# Patient Record
(demographics unavailable — no encounter records)

---

## 2024-10-25 NOTE — BEGINNING OF VISIT
[0] : 2) Feeling down, depressed, or hopeless: Not at all (0) [PHQ-2 Negative] : PHQ-2 Negative [Pain Scale: ___] : On a scale of 1-10, today the patient's pain is a(n) [unfilled]. [Former] : Former [15-19] : 15-19 [> 15 Years] : > 15 Years [Date Discussed (MM/DD/YY): ___] : Discussed: [unfilled] [Reviewed updated] : Reviewed updated [[id number]] : 0

## 2024-10-25 NOTE — CONSULT LETTER
[Dear  ___] : Dear  [unfilled], [Courtesy Letter:] : I had the pleasure of seeing your patient, [unfilled], in my office today. [Please see my note below.] : Please see my note below. [Sincerely,] : Sincerely, [FreeTextEntry3] : Ishan Smith MD, MPH\par  Attending Physician\par  Hematology Oncology\par  [facility]\par  [facility]\par

## 2024-10-25 NOTE — HISTORY OF PRESENT ILLNESS
[de-identified] : Ms Marshall is a 61 year old female recently seen as inpatient consultation for PE, here for follow up  SHe was admitted to [facility] (19-19) palpitations found to have saddle PE CT angiogram: Saddle embolus with extensive pulmonary emboli extending throughout the right and left pulmonary arterial vasculature  She was noted to also have hypertensive urgency with elevated BP of 214/135. On labs,  an elevated D-dimer to 9.44 was noted which was suspicious for pulmonary embolism. On CT angiography of the chest she was noted to have a saddle embolus with extensive pulmonary emboli extending throughout the right and left pulmonary arterial vasculature. Patient was evaluated by Cardiology and an echocardiogram was done. She was admitted to the ICU for close monitoring and management. She was started on anticoagulation treatment for the pulmonary embolism with Lovenox 80 mg. Once she clinically improved with resolution of the palpitations and shortness of breath, and she remained hemodynamically stable with good oxygen saturations on room air, she was switched from Lovenox to Eliquis 10mg orally twice daily for the treatment of the pulmonary embolism. For the hypertensive urgency with elevated BPs, Cardiology adjusted patient's BP medications - added Amlodipine 5mg daily and Carvedilol 12.5mg orally twice daily. Patient's home Losartan was held during her stay because of the acute kidney injury. Patient's BPs became more controlled and remained stable after the medication adjustments. Patient was kept on IV fluids for the acute kidney injury which resolved. Patient noted chronic history of double vision resulting from right eye droopiness, but reported that she had recently had full neurological and ophthalmological workup done including CT scans and MRIs, which were all negative. This was confirmed by her PMD as well. Patient mentioned that she was prescribed special glasses by Neuro-opthalmology to wear to correct the double vision. She was instructed to continue with the glasses and to follow up with the Neuro-opthalmologist as outpatient. Because she did not have any neurological deficits, it was not deemed necessary to do further workup while inpatient. Patient will need to follow up with her PMD to evaluate whether she will need further workup.  She remained clinically and hemodynamically stable, and was deemed ready for discharge on .   She was discharged on eliquis  SHe is retired but continues to live a healthy life- stays active with her household chores and does yoga Denies any long distance travel, immobilization prior to the event No recent surgeries or hospitalization  No personal or family ho VTEs or miscarriages  She is ex smoker - quit 28 years back. SMoked 1/2 ppd  x 15 years  Hgb 9.8 WBC 3.5  SIFx: One IgG Kappa Band and One Weak Free Kappa Light Chain  Identified  SPEP: M-spike 1: 1.3 g/dl; Mspike 2: unable to quantitate FLCR: 85.80  FActor V Leiden mutation, Prothrombin gene mutation, APS panel: negative Serum viscosity: 1.7  Free protein S Antigen: 44 AT3 A AT3 activity: 124  D Dimer: 9.44 -> 2.17  Bone marrow core biopsy, clot sections and aspirate smears.(19) Plasma cell neoplasm, IgG Kappa, 5-8% of the bone marrow elements, see comment. Adequately maturing myeloid elements; slight erythroid hyperplasia. No evidence of overt dysplasia. No evidence of amyloid deposition. Normal karyotype; no aberrations detected by FISH analysis.  Bone marrow- 10% plasma cells Negative FISH for myeloma  2019 PET/CT without any bone metastases  SHe was recently admitted to [facility] (20-20) for fever, generalized weakness, malaise, bodyaches and nausea. Nasal swab was negative for flu and RSV. Pancultures were negative. SHe was empirically treated with levaquin pending cultures and was subsequently was discharged home  Reports that she fell on her way to the office. No head injury. Scraped her left knee and fingers with minimal bruise, She was bleeding for left fingers s/p dressing  She reports 3 month ho intermittent tingling and numbness in fingers and toes. For the past 2 months she has been dropping things and also has wobbling gait. She somehow did not mention during previous clinic visits She reports being very active prior- was performing yoga regularly SHe now walks with a cane SHe was recently admitted to [facility] (20-20) for fever, generalized weakness, malaise, bodyaches and nausea. Nasal swab was negative for flu and RSV. Pancultures were negative. SHe was empirically treated with levaquin pending cultures and was subsequently was discharged home A week later she fell on her way to the office. No head injury. Scraped her left knee and fingers with minimal bruise, She was bleeding for left fingers s/p dressing  She reports 3 month ho intermittent tingling and numbness in fingers and toes -> Today she reports no tingling and numbness x 1 week  For the past 2 months she has been dropping things and also has wobbling gait- she still drop things but not as often. Gait is back to normal  No longer needs a cane   She saw Dr. Cyril Ricks at [facility] on 12/3/20, was given pamphlet to read and to follow up for stem cell transplant.  She is agreeable to have stem cell collection. May not opt for transplant upfront She will follow up with Dr Ricks  Bone marrow biopsy, bone marrow clot, and bone marrow aspirate: -Markedly hypocellular bone marrow with erythroid predominance and focal/minimal trilineage hematopoiesis with maturation. -Iron stores are present. MRD was not sent  21 [facility] flow cytometry 0.02% phenotypically abnormal cells detected.  Monoclonal plasma cell phenotype, kappa positive.   Bone marrow biopsy, clot and bone marrow aspirate -normocellular bone marrow (overall 30% cellularity) with erythroid - predominantl trilineage hematopoiesis and maturation, iron stores presents. 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            AFB stain:  No mycobacteria identified.            GMS stain:  No fungi identified. C.  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         AFB stain:  No mycobacteria identified.         GMS stain:  No fungi identified.  PET/CT (2021): No lytic bone lesion Multiple new symmetric hypermetabolic slightly enlarged mediastinal and bilateral hilar lymph nodes, for example: -Para-aortic, 2.2 x 0.9 cm, SUV 11.4 107 -Subcarinal, 2.9 x 1.1 cm, SUV 16.3,  -Anterior upper mediastinum, 1.4 x 0.5 cm, SUV 15.0, SUV 98 -Right hilum, up to SUV 14.0 -Left hilum, up to SUV 13.9.  2024 Pet/CT 1. No osseous lesions suspicious for FDG avid malignancy.  2. 3.1 x 2.3 cm FDG avid exophytic mass at the distal pancreatic body/proximal tail, SUV max 3.3, unchanged in size since . A neoplastic lesion cannot be excluded. Further evaluation with MRI is recommended.  3. Mild, nonspecific uptake in a normal-sized subcarinal node. Otherwise, since , there has been interval resolution of hypermetabolic mediastinal and bilateral hilar lymphadenopathy.  4. Left total hip arthroplasty with muscle swelling about the left hip and possible fluid density. There is associated increased, inflammatory uptake, possibly postsurgical in nature. Correlate clinically.  5. Mildly FDG avid presacral edema and lower rectal wall thickening, probably representing proctitis or hemorrhoids.  Correlate clinically  [de-identified] : She is seen today for follow up  s/p RVD (9/25/19 - 1/29/21) On Revlimid maintenance ( 2/5/21 - present) - off since Sept due to stem cell collection - resumed in Jan 2022 Lower dose Revlimid 10 mg daily x 21 days on 5/5/022 - 5/31/22 -  6/3/22, restarted August 2022.  Revlimid held since 12/2022 due to Covid and worsening anemia.   Overall feels well. c/o itchy rash over the back, left elbow She has not yet had MRI abdomen to assess pancreatic mass Reports that it is scheduled for Nov 4th.  Upon enquiry, her PCP is not Dr Tammy Oseguera. [facility] now does not [facility] Strongly advised to see PCP ASAP and be uptodate with age appropriate medical care and cancer screening  Weight - 151 -> 143 lbs -> 140 lbs -> 135 lbs  -139 lbs -> 140 lbs -> 138 lbs -> 132 lbs -> 141 lbs  -> 147 lbs -> 150 lbs -> 156 lbs -> -> 153--> 158--> 160 lbs -> 159 lbs -> 162 lbs  -> 152 lbs (Covid)  -> 161 -> hip surgery--> 145 lbs

## 2024-10-25 NOTE — PHYSICAL EXAM
[Ambulatory and capable of all self care but unable to carry out any work activities] : Status 2- Ambulatory and capable of all self care but unable to carry out any work activities. Up and about more than 50% of waking hours [Normal] : affect appropriate [de-identified] : Atopic dermatitis nape of neck, left elbow

## 2024-10-25 NOTE — BEGINNING OF VISIT
[0] : 2) Feeling down, depressed, or hopeless: Not at all (0) [PHQ-2 Negative] : PHQ-2 Negative [Pain Scale: ___] : On a scale of 1-10, today the patient's pain is a(n) [unfilled]. [Former] : Former [15-19] : 15-19 [> 15 Years] : > 15 Years [Date Discussed (MM/DD/YY): ___] : Discussed: [unfilled] [Reviewed updated] : Reviewed updated [[id number]] : 0

## 2024-10-25 NOTE — PHYSICAL EXAM
[Ambulatory and capable of all self care but unable to carry out any work activities] : Status 2- Ambulatory and capable of all self care but unable to carry out any work activities. Up and about more than 50% of waking hours [Normal] : affect appropriate [de-identified] : Atopic dermatitis nape of neck, left elbow

## 2024-10-25 NOTE — HISTORY OF PRESENT ILLNESS
[de-identified] : Ms Marshall is a 61 year old female recently seen as inpatient consultation for PE, here for follow up  SHe was admitted to [facility] (19-19) palpitations found to have saddle PE CT angiogram: Saddle embolus with extensive pulmonary emboli extending throughout the right and left pulmonary arterial vasculature  She was noted to also have hypertensive urgency with elevated BP of 214/135. On labs,  an elevated D-dimer to 9.44 was noted which was suspicious for pulmonary embolism. On CT angiography of the chest she was noted to have a saddle embolus with extensive pulmonary emboli extending throughout the right and left pulmonary arterial vasculature. Patient was evaluated by Cardiology and an echocardiogram was done. She was admitted to the ICU for close monitoring and management. She was started on anticoagulation treatment for the pulmonary embolism with Lovenox 80 mg. Once she clinically improved with resolution of the palpitations and shortness of breath, and she remained hemodynamically stable with good oxygen saturations on room air, she was switched from Lovenox to Eliquis 10mg orally twice daily for the treatment of the pulmonary embolism. For the hypertensive urgency with elevated BPs, Cardiology adjusted patient's BP medications - added Amlodipine 5mg daily and Carvedilol 12.5mg orally twice daily. Patient's home Losartan was held during her stay because of the acute kidney injury. Patient's BPs became more controlled and remained stable after the medication adjustments. Patient was kept on IV fluids for the acute kidney injury which resolved. Patient noted chronic history of double vision resulting from right eye droopiness, but reported that she had recently had full neurological and ophthalmological workup done including CT scans and MRIs, which were all negative. This was confirmed by her PMD as well. Patient mentioned that she was prescribed special glasses by Neuro-opthalmology to wear to correct the double vision. She was instructed to continue with the glasses and to follow up with the Neuro-opthalmologist as outpatient. Because she did not have any neurological deficits, it was not deemed necessary to do further workup while inpatient. Patient will need to follow up with her PMD to evaluate whether she will need further workup.  She remained clinically and hemodynamically stable, and was deemed ready for discharge on .   She was discharged on eliquis  SHe is retired but continues to live a healthy life- stays active with her household chores and does yoga Denies any long distance travel, immobilization prior to the event No recent surgeries or hospitalization  No personal or family ho VTEs or miscarriages  She is ex smoker - quit 28 years back. SMoked 1/2 ppd  x 15 years  Hgb 9.8 WBC 3.5  SIFx: One IgG Kappa Band and One Weak Free Kappa Light Chain  Identified  SPEP: M-spike 1: 1.3 g/dl; Mspike 2: unable to quantitate FLCR: 85.80  FActor V Leiden mutation, Prothrombin gene mutation, APS panel: negative Serum viscosity: 1.7  Free protein S Antigen: 44 AT3 A AT3 activity: 124  D Dimer: 9.44 -> 2.17  Bone marrow core biopsy, clot sections and aspirate smears.(19) Plasma cell neoplasm, IgG Kappa, 5-8% of the bone marrow elements, see comment. Adequately maturing myeloid elements; slight erythroid hyperplasia. No evidence of overt dysplasia. No evidence of amyloid deposition. Normal karyotype; no aberrations detected by FISH analysis.  Bone marrow- 10% plasma cells Negative FISH for myeloma  2019 PET/CT without any bone metastases  SHe was recently admitted to [facility] (20-20) for fever, generalized weakness, malaise, bodyaches and nausea. Nasal swab was negative for flu and RSV. Pancultures were negative. SHe was empirically treated with levaquin pending cultures and was subsequently was discharged home  Reports that she fell on her way to the office. No head injury. Scraped her left knee and fingers with minimal bruise, She was bleeding for left fingers s/p dressing  She reports 3 month ho intermittent tingling and numbness in fingers and toes. For the past 2 months she has been dropping things and also has wobbling gait. She somehow did not mention during previous clinic visits She reports being very active prior- was performing yoga regularly SHe now walks with a cane SHe was recently admitted to [facility] (20-20) for fever, generalized weakness, malaise, bodyaches and nausea. Nasal swab was negative for flu and RSV. Pancultures were negative. SHe was empirically treated with levaquin pending cultures and was subsequently was discharged home A week later she fell on her way to the office. No head injury. Scraped her left knee and fingers with minimal bruise, She was bleeding for left fingers s/p dressing  She reports 3 month ho intermittent tingling and numbness in fingers and toes -> Today she reports no tingling and numbness x 1 week  For the past 2 months she has been dropping things and also has wobbling gait- she still drop things but not as often. Gait is back to normal  No longer needs a cane   She saw Dr. Cyril Ricks at [facility] on 12/3/20, was given pamphlet to read and to follow up for stem cell transplant.  She is agreeable to have stem cell collection. May not opt for transplant upfront She will follow up with Dr Ricks  Bone marrow biopsy, bone marrow clot, and bone marrow aspirate: -Markedly hypocellular bone marrow with erythroid predominance and focal/minimal trilineage hematopoiesis with maturation. -Iron stores are present. MRD was not sent  21 [facility] flow cytometry 0.02% phenotypically abnormal cells detected.  Monoclonal plasma cell phenotype, kappa positive.   Bone marrow biopsy, clot and bone marrow aspirate -normocellular bone marrow (overall 30% cellularity) with erythroid - predominantl trilineage hematopoiesis and maturation, iron stores presents. 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            AFB stain:  No mycobacteria identified.            GMS stain:  No fungi identified. C.  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         AFB stain:  No mycobacteria identified.         GMS stain:  No fungi identified.  PET/CT (2021): No lytic bone lesion Multiple new symmetric hypermetabolic slightly enlarged mediastinal and bilateral hilar lymph nodes, for example: -Para-aortic, 2.2 x 0.9 cm, SUV 11.4 107 -Subcarinal, 2.9 x 1.1 cm, SUV 16.3,  -Anterior upper mediastinum, 1.4 x 0.5 cm, SUV 15.0, SUV 98 -Right hilum, up to SUV 14.0 -Left hilum, up to SUV 13.9.  2024 Pet/CT 1. No osseous lesions suspicious for FDG avid malignancy.  2. 3.1 x 2.3 cm FDG avid exophytic mass at the distal pancreatic body/proximal tail, SUV max 3.3, unchanged in size since . A neoplastic lesion cannot be excluded. Further evaluation with MRI is recommended.  3. Mild, nonspecific uptake in a normal-sized subcarinal node. Otherwise, since , there has been interval resolution of hypermetabolic mediastinal and bilateral hilar lymphadenopathy.  4. Left total hip arthroplasty with muscle swelling about the left hip and possible fluid density. There is associated increased, inflammatory uptake, possibly postsurgical in nature. Correlate clinically.  5. Mildly FDG avid presacral edema and lower rectal wall thickening, probably representing proctitis or hemorrhoids.  Correlate clinically  [de-identified] : She is seen today for follow up  s/p RVD (9/25/19 - 1/29/21) On Revlimid maintenance ( 2/5/21 - present) - off since Sept due to stem cell collection - resumed in Jan 2022 Lower dose Revlimid 10 mg daily x 21 days on 5/5/022 - 5/31/22 -  6/3/22, restarted August 2022.  Revlimid held since 12/2022 due to Covid and worsening anemia.   Overall feels well. c/o itchy rash over the back, left elbow She has not yet had MRI abdomen to assess pancreatic mass Reports that it is scheduled for Nov 4th.  Upon enquiry, her PCP is not Dr Tammy Oseguera. [facility] now does not have a [facility] Strongly advised to see PCP ASAP and be uptodate with age appropriate medical care and cancer screening  Weight - 151 -> 143 lbs -> 140 lbs -> 135 lbs  -139 lbs -> 140 lbs -> 138 lbs -> 132 lbs -> 141 lbs  -> 147 lbs -> 150 lbs -> 156 lbs -> -> 153--> 158--> 160 lbs -> 159 lbs -> 162 lbs  -> 152 lbs (Covid)  -> 161 -> hip surgery--> 145 lbs

## 2024-10-25 NOTE — HISTORY OF PRESENT ILLNESS
[de-identified] : Ms Marshall is a 61 year old female recently seen as inpatient consultation for PE, here for follow up  SHe was admitted to [facility] (19-19) palpitations found to have saddle PE CT angiogram: Saddle embolus with extensive pulmonary emboli extending throughout the right and left pulmonary arterial vasculature  She was noted to also have hypertensive urgency with elevated BP of 214/135. On labs,  an elevated D-dimer to 9.44 was noted which was suspicious for pulmonary embolism. On CT angiography of the chest she was noted to have a saddle embolus with extensive pulmonary emboli extending throughout the right and left pulmonary arterial vasculature. Patient was evaluated by Cardiology and an echocardiogram was done. She was admitted to the ICU for close monitoring and management. She was started on anticoagulation treatment for the pulmonary embolism with Lovenox 80 mg. Once she clinically improved with resolution of the palpitations and shortness of breath, and she remained hemodynamically stable with good oxygen saturations on room air, she was switched from Lovenox to Eliquis 10mg orally twice daily for the treatment of the pulmonary embolism. For the hypertensive urgency with elevated BPs, Cardiology adjusted patient's BP medications - added Amlodipine 5mg daily and Carvedilol 12.5mg orally twice daily. Patient's home Losartan was held during her stay because of the acute kidney injury. Patient's BPs became more controlled and remained stable after the medication adjustments. Patient was kept on IV fluids for the acute kidney injury which resolved. Patient noted chronic history of double vision resulting from right eye droopiness, but reported that she had recently had full neurological and ophthalmological workup done including CT scans and MRIs, which were all negative. This was confirmed by her PMD as well. Patient mentioned that she was prescribed special glasses by Neuro-opthalmology to wear to correct the double vision. She was instructed to continue with the glasses and to follow up with the Neuro-opthalmologist as outpatient. Because she did not have any neurological deficits, it was not deemed necessary to do further workup while inpatient. Patient will need to follow up with her PMD to evaluate whether she will need further workup.  She remained clinically and hemodynamically stable, and was deemed ready for discharge on .   She was discharged on eliquis  SHe is retired but continues to live a healthy life- stays active with her household chores and does yoga Denies any long distance travel, immobilization prior to the event No recent surgeries or hospitalization  No personal or family ho VTEs or miscarriages  She is ex smoker - quit 28 years back. SMoked 1/2 ppd  x 15 years  Hgb 9.8 WBC 3.5  SIFx: One IgG Kappa Band and One Weak Free Kappa Light Chain  Identified  SPEP: M-spike 1: 1.3 g/dl; Mspike 2: unable to quantitate FLCR: 85.80  FActor V Leiden mutation, Prothrombin gene mutation, APS panel: negative Serum viscosity: 1.7  Free protein S Antigen: 44 AT3 A AT3 activity: 124  D Dimer: 9.44 -> 2.17  Bone marrow core biopsy, clot sections and aspirate smears.(19) Plasma cell neoplasm, IgG Kappa, 5-8% of the bone marrow elements, see comment. Adequately maturing myeloid elements; slight erythroid hyperplasia. No evidence of overt dysplasia. No evidence of amyloid deposition. Normal karyotype; no aberrations detected by FISH analysis.  Bone marrow- 10% plasma cells Negative FISH for myeloma  2019 PET/CT without any bone metastases  SHe was recently admitted to [facility] (20-20) for fever, generalized weakness, malaise, bodyaches and nausea. Nasal swab was negative for flu and RSV. Pancultures were negative. SHe was empirically treated with levaquin pending cultures and was subsequently was discharged home  Reports that she fell on her way to the office. No head injury. Scraped her left knee and fingers with minimal bruise, She was bleeding for left fingers s/p dressing  She reports 3 month ho intermittent tingling and numbness in fingers and toes. For the past 2 months she has been dropping things and also has wobbling gait. She somehow did not mention during previous clinic visits She reports being very active prior- was performing yoga regularly SHe now walks with a cane SHe was recently admitted to [facility] (20-20) for fever, generalized weakness, malaise, bodyaches and nausea. Nasal swab was negative for flu and RSV. Pancultures were negative. SHe was empirically treated with levaquin pending cultures and was subsequently was discharged home A week later she fell on her way to the office. No head injury. Scraped her left knee and fingers with minimal bruise, She was bleeding for left fingers s/p dressing  She reports 3 month ho intermittent tingling and numbness in fingers and toes -> Today she reports no tingling and numbness x 1 week  For the past 2 months she has been dropping things and also has wobbling gait- she still drop things but not as often. Gait is back to normal  No longer needs a cane   She saw Dr. Cyril Ricks at [facility] on 12/3/20, was given pamphlet to read and to follow up for stem cell transplant.  She is agreeable to have stem cell collection. May not opt for transplant upfront She will follow up with Dr Ricks  Bone marrow biopsy, bone marrow clot, and bone marrow aspirate: -Markedly hypocellular bone marrow with erythroid predominance and focal/minimal trilineage hematopoiesis with maturation. -Iron stores are present. MRD was not sent  21 [facility] flow cytometry 0.02% phenotypically abnormal cells detected.  Monoclonal plasma cell phenotype, kappa positive.   Bone marrow biopsy, clot and bone marrow aspirate -normocellular bone marrow (overall 30% cellularity) with erythroid - predominantl trilineage hematopoiesis and maturation, iron stores presents. 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            AFB stain:  No mycobacteria identified.            GMS stain:  No fungi identified. C.  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         AFB stain:  No mycobacteria identified.         GMS stain:  No fungi identified.  PET/CT (2021): No lytic bone lesion Multiple new symmetric hypermetabolic slightly enlarged mediastinal and bilateral hilar lymph nodes, for example: -Para-aortic, 2.2 x 0.9 cm, SUV 11.4 107 -Subcarinal, 2.9 x 1.1 cm, SUV 16.3,  -Anterior upper mediastinum, 1.4 x 0.5 cm, SUV 15.0, SUV 98 -Right hilum, up to SUV 14.0 -Left hilum, up to SUV 13.9.  2024 Pet/CT 1. No osseous lesions suspicious for FDG avid malignancy.  2. 3.1 x 2.3 cm FDG avid exophytic mass at the distal pancreatic body/proximal tail, SUV max 3.3, unchanged in size since . A neoplastic lesion cannot be excluded. Further evaluation with MRI is recommended.  3. Mild, nonspecific uptake in a normal-sized subcarinal node. Otherwise, since , there has been interval resolution of hypermetabolic mediastinal and bilateral hilar lymphadenopathy.  4. Left total hip arthroplasty with muscle swelling about the left hip and possible fluid density. There is associated increased, inflammatory uptake, possibly postsurgical in nature. Correlate clinically.  5. Mildly FDG avid presacral edema and lower rectal wall thickening, probably representing proctitis or hemorrhoids.  Correlate clinically  [de-identified] : She is seen today for follow up  s/p RVD (9/25/19 - 1/29/21) On Revlimid maintenance ( 2/5/21 - present) - off since Sept due to stem cell collection - resumed in Jan 2022 Lower dose Revlimid 10 mg daily x 21 days on 5/5/022 - 5/31/22 -  6/3/22, restarted August 2022.  Revlimid held since 12/2022 due to Covid and worsening anemia.   Overall feels well. c/o itchy rash over the back, left elbow She has not yet had MRI abdomen to assess pancreatic mass Reports that it is scheduled for Nov 4th.  Upon enquiry, her PCP is not Dr Tammy Oseguera. [facility] now does not have a P[facility] Strongly advised to see PCP ASAP and be uptodate with age appropriate medical care and cancer screening  Weight - 151 -> 143 lbs -> 140 lbs -> 135 lbs  -139 lbs -> 140 lbs -> 138 lbs -> 132 lbs -> 141 lbs  -> 147 lbs -> 150 lbs -> 156 lbs -> -> 153--> 158--> 160 lbs -> 159 lbs -> 162 lbs  -> 152 lbs (Covid)  -> 161 -> hip surgery--> 145 lbs

## 2024-10-25 NOTE — ASSESSMENT
[Reviewed updated] : Reviewed updated [Designated Health Care Proxy] : Designated Health Care Proxy [Name: ___] : Name: [unfilled] [Relationship: ___] : Relationship: [unfilled] [Full Code] : full code [FreeTextEntry1] : # Multiple myeloma One IgG Kappa Band and One Weak Free Kappa Light Chain FLCR 125, renal failure -> Based on IMWG definition- this is multiple myeloma Bone marrow shows 10% clonal plasma cells 9/26/2019 PET/CT without any bone metastases VD (9/25/19-1/21); Revlimid 25 mg 2 weeks on 1 week off since 10/21/2019. No aspirin as she is already on eliquis Acyclovir BID She has been on RVD ~16 months Bone marrow (1/21)- No evidence of myeloma. MRD was not sent Was in Complete remission 7/7/20-4/2021 Worsening Leukopenia and thrombocytopenia but no monoclonal gammopathy seen 9/22/21 bone marrow - 0.02% phenotypically abnormal cells detected. Stem cell collection with Dr. Ricks on 10/10-10/30/21 - Advise dot have stem cell transplant with she has postponed. Bone marrow (3/2023) shows no evidence of MM 7/29/2024 Pet/CT 1. No osseous lesions suspicious for FDG avid malignancy.  2. 3.1 x 2.3 cm FDG avid exophytic mass at the distal pancreatic body/proximal tail, SUV max 3.3, unchanged in size since 2021. A neoplastic lesion cannot be excluded. Further evaluation with MRI is recommended.  3. Mild, nonspecific uptake in a normal-sized subcarinal node. Otherwise, since 2021, there has been interval resolution of hypermetabolic mediastinal and bilateral hilar lymphadenopathy.  4. Left total hip arthroplasty with muscle swelling about the left hip and possible fluid density. There is associated increased, inflammatory uptake, possibly postsurgical in nature. Correlate clinically.  5. Mildly FDG avid presacral edema and lower rectal wall thickening, probably representing proctitis or hemorrhoids.  Correlate clinically  Patient is here for follow up No bone pains She has been off of treatment and on observation Labs ordered, drawn in the office, reviewed, analyzed and discussed continue to monitor along with MM labs every 4-6 weeks.   ##Pancreatic mass  She was supposed to obtain MRI of abdomen prior to the visit Reports it is scheduled for Nov 2024 Denies any weight loss Advised to call few days after the MRI to discuss findings  Skin rash Likely atopic dermatitis Topical hydrocortisone not helping Switch to fluticasone Refer to dermatology  #screenings: Advised to be uptodate with age appropriate cancer screening  # Weight loss - improved Unintentional PET/CT (6/21)- Mediastinal lymphadenopathy Had mediastinoscopy and lymph node biopsy Path: non-necrotizing granulomatous lymphadenitis with sclerosis D/D at this time would be MM vs drug induced vs ?sarcoidosis appetite is improved with weight gained.  #Saddle PE Thrombophilia suggestive of persistent low protein S antigen On eliquis Long term anticoagulation  Follow-up in 4 weeks  CBC, CMP, MM [AdvancecareDate] : 10/25/24

## 2024-10-25 NOTE — BEGINNING OF VISIT
[0] : 2) Feeling down, depressed, or hopeless: Not at all (0) [PHQ-2 Negative] : PHQ-2 Negative [Pain Scale: ___] : On a scale of 1-10, today the patient's pain is a(n) [unfilled]. [Former] : Former [15-19] : 15-19 [> 15 Years] : > 15 Years [Date Discussed (MM/DD/YY): ___] : Discussed: [unfilled] [Reviewed updated] : Reviewed updated [[id number]] : 0

## 2024-10-25 NOTE — CONSULT LETTER
[Dear  ___] : Dear  [unfilled], [Courtesy Letter:] : I had the pleasure of seeing your patient, [unfilled], in my office today. [Please see my note below.] : Please see my note below. [Sincerely,] : Sincerely, [FreeTextEntry3] : Ishan Smith MD, MPH\par  Attending Physician\par  Hematology Oncology\par  [facility] [place]\par  [facility]\par

## 2024-10-25 NOTE — CONSULT LETTER
[Dear  ___] : Dear  [unfilled], [Courtesy Letter:] : I had the pleasure of seeing your patient, [unfilled], in my office today. [Please see my note below.] : Please see my note below. [Sincerely,] : Sincerely, [FreeTextEntry3] : Ishan Smith MD, MPH\par  Attending Physician\par  Hematology Oncology\par  [facility] [place]\par  [facility]\par

## 2024-10-25 NOTE — BEGINNING OF VISIT
[0] : 2) Feeling down, depressed, or hopeless: Not at all (0) [PHQ-2 Negative] : PHQ-2 Negative [Pain Scale: ___] : On a scale of 1-10, today the patient's pain is a(n) [unfilled]. [Former] : Former [15-19] : 15-19 [> 15 Years] : > 15 Years [Date Discussed (MM/DD/YY): ___] : Discussed: [unfilled] [Reviewed updated] : Reviewed updated [[id number]] : 0

## 2024-10-25 NOTE — PHYSICAL EXAM
[Ambulatory and capable of all self care but unable to carry out any work activities] : Status 2- Ambulatory and capable of all self care but unable to carry out any work activities. Up and about more than 50% of waking hours [Normal] : affect appropriate [de-identified] : Atopic dermatitis nape of neck, left elbow

## 2024-10-25 NOTE — HISTORY OF PRESENT ILLNESS
[de-identified] : Ms Marshall is a 61 year old female recently seen as inpatient consultation for PE, here for follow up  SHe was admitted to [facility] (19-19) palpitations found to have saddle PE CT angiogram: Saddle embolus with extensive pulmonary emboli extending throughout the right and left pulmonary arterial vasculature  She was noted to also have hypertensive urgency with elevated BP of 214/135. On labs,  an elevated D-dimer to 9.44 was noted which was suspicious for pulmonary embolism. On CT angiography of the chest she was noted to have a saddle embolus with extensive pulmonary emboli extending throughout the right and left pulmonary arterial vasculature. Patient was evaluated by Cardiology and an echocardiogram was done. She was admitted to the ICU for close monitoring and management. She was started on anticoagulation treatment for the pulmonary embolism with Lovenox 80 mg. Once she clinically improved with resolution of the palpitations and shortness of breath, and she remained hemodynamically stable with good oxygen saturations on room air, she was switched from Lovenox to Eliquis 10mg orally twice daily for the treatment of the pulmonary embolism. For the hypertensive urgency with elevated BPs, Cardiology adjusted patient's BP medications - added Amlodipine 5mg daily and Carvedilol 12.5mg orally twice daily. Patient's home Losartan was held during her stay because of the acute kidney injury. Patient's BPs became more controlled and remained stable after the medication adjustments. Patient was kept on IV fluids for the acute kidney injury which resolved. Patient noted chronic history of double vision resulting from right eye droopiness, but reported that she had recently had full neurological and ophthalmological workup done including CT scans and MRIs, which were all negative. This was confirmed by her PMD as well. Patient mentioned that she was prescribed special glasses by Neuro-opthalmology to wear to correct the double vision. She was instructed to continue with the glasses and to follow up with the Neuro-opthalmologist as outpatient. Because she did not have any neurological deficits, it was not deemed necessary to do further workup while inpatient. Patient will need to follow up with her PMD to evaluate whether she will need further workup.  She remained clinically and hemodynamically stable, and was deemed ready for discharge on .   She was discharged on eliquis  SHe is retired but continues to live a healthy life- stays active with her household chores and does yoga Denies any long distance travel, immobilization prior to the event No recent surgeries or hospitalization  No personal or family ho VTEs or miscarriages  She is ex smoker - quit 28 years back. SMoked 1/2 ppd  x 15 years  Hgb 9.8 WBC 3.5  SIFx: One IgG Kappa Band and One Weak Free Kappa Light Chain  Identified  SPEP: M-spike 1: 1.3 g/dl; Mspike 2: unable to quantitate FLCR: 85.80  FActor V Leiden mutation, Prothrombin gene mutation, APS panel: negative Serum viscosity: 1.7  Free protein S Antigen: 44 AT3 A AT3 activity: 124  D Dimer: 9.44 -> 2.17  Bone marrow core biopsy, clot sections and aspirate smears.(19) Plasma cell neoplasm, IgG Kappa, 5-8% of the bone marrow elements, see comment. Adequately maturing myeloid elements; slight erythroid hyperplasia. No evidence of overt dysplasia. No evidence of amyloid deposition. Normal karyotype; no aberrations detected by FISH analysis.  Bone marrow- 10% plasma cells Negative FISH for myeloma  2019 PET/CT without any bone metastases  SHe was recently admitted to [facility] (20-20) for fever, generalized weakness, malaise, bodyaches and nausea. Nasal swab was negative for flu and RSV. Pancultures were negative. SHe was empirically treated with levaquin pending cultures and was subsequently was discharged home  Reports that she fell on her way to the office. No head injury. Scraped her left knee and fingers with minimal bruise, She was bleeding for left fingers s/p dressing  She reports 3 month ho intermittent tingling and numbness in fingers and toes. For the past 2 months she has been dropping things and also has wobbling gait. She somehow did not mention during previous clinic visits She reports being very active prior- was performing yoga regularly SHe now walks with a cane SHe was recently admitted to [facility] (20-20) for fever, generalized weakness, malaise, bodyaches and nausea. Nasal swab was negative for flu and RSV. Pancultures were negative. SHe was empirically treated with levaquin pending cultures and was subsequently was discharged home A week later she fell on her way to the office. No head injury. Scraped her left knee and fingers with minimal bruise, She was bleeding for left fingers s/p dressing  She reports 3 month ho intermittent tingling and numbness in fingers and toes -> Today she reports no tingling and numbness x 1 week  For the past 2 months she has been dropping things and also has wobbling gait- she still drop things but not as often. Gait is back to normal  No longer needs a cane   She saw Dr. Cyril Ricks at [facility] [place] on 12/3/20, was given pamphlet to read and to follow up for stem cell transplant.  She is agreeable to have stem cell collection. May not opt for transplant upfront She will follow up with Dr Ricks  Bone marrow biopsy, bone marrow clot, and bone marrow aspirate: -Markedly hypocellular bone marrow with erythroid predominance and focal/minimal trilineage hematopoiesis with maturation. -Iron stores are present. MRD was not sent  21 [facility] flow cytometry 0.02% phenotypically abnormal cells detected.  Monoclonal plasma cell phenotype, kappa positive.   Bone marrow biopsy, clot and bone marrow aspirate -normocellular bone marrow (overall 30% cellularity) with erythroid - predominantl trilineage hematopoiesis and maturation, iron stores presents. 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            AFB stain:  No mycobacteria identified.            GMS stain:  No fungi identified. C.  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         AFB stain:  No mycobacteria identified.         GMS stain:  No fungi identified.  PET/CT (2021): No lytic bone lesion Multiple new symmetric hypermetabolic slightly enlarged mediastinal and bilateral hilar lymph nodes, for example: -Para-aortic, 2.2 x 0.9 cm, SUV 11.4 107 -Subcarinal, 2.9 x 1.1 cm, SUV 16.3,  -Anterior upper mediastinum, 1.4 x 0.5 cm, SUV 15.0, SUV 98 -Right hilum, up to SUV 14.0 -Left hilum, up to SUV 13.9.  2024 Pet/CT 1. No osseous lesions suspicious for FDG avid malignancy.  2. 3.1 x 2.3 cm FDG avid exophytic mass at the distal pancreatic body/proximal tail, SUV max 3.3, unchanged in size since . A neoplastic lesion cannot be excluded. Further evaluation with MRI is recommended.  3. Mild, nonspecific uptake in a normal-sized subcarinal node. Otherwise, since , there has been interval resolution of hypermetabolic mediastinal and bilateral hilar lymphadenopathy.  4. Left total hip arthroplasty with muscle swelling about the left hip and possible fluid density. There is associated increased, inflammatory uptake, possibly postsurgical in nature. Correlate clinically.  5. Mildly FDG avid presacral edema and lower rectal wall thickening, probably representing proctitis or hemorrhoids.  Correlate clinically  [de-identified] : She is seen today for follow up  s/p RVD (9/25/19 - 1/29/21) On Revlimid maintenance ( 2/5/21 - present) - off since Sept due to stem cell collection - resumed in Jan 2022 Lower dose Revlimid 10 mg daily x 21 days on 5/5/022 - 5/31/22 -  6/3/22, restarted August 2022.  Revlimid held since 12/2022 due to Covid and worsening anemia.   Overall feels well. c/o itchy rash over the back, left elbow She has not yet had MRI abdomen to assess pancreatic mass Reports that it is scheduled for Nov 4th.  Upon enquiry, her PCP is not Dr Tammy Oseguera. [facility] now does not have a PCP Strongly advised to see PCP ASAP and be uptodate with age appropriate medical care and cancer screening  Weight - 151 -> 143 lbs -> 140 lbs -> 135 lbs  -139 lbs -> 140 lbs -> 138 lbs -> 132 lbs -> 141 lbs  -> 147 lbs -> 150 lbs -> 156 lbs -> -> 153--> 158--> 160 lbs -> 159 lbs -> 162 lbs  -> 152 lbs (Covid)  -> 161 -> hip surgery--> 145 lbs

## 2024-10-25 NOTE — ADDENDUM
[FreeTextEntry1] :  ## Multiple myeloma One IgG Kappa Band and One Weak Free Kappa Light Chain FLCR 125, renal failure -> Based on IMWG definition- this is multiple myeloma Bone marrow shows 10% clonal plasma cells 9/26/2019 PET/CT without any bone metastases VD (9/25/19-1/21); Revlimid 25 mg 2 weeks on 1 week off since 10/21/2019. No aspirin as she is already on eliquis Acyclovir BID She has been on RVD ~16 months Bone marrow (1/21)- No evidence of myeloma. MRD was not sent Was in Complete remission 7/7/20-4/2021 Worsening Leukopenia and thrombocytopenia but no monoclonal gammopathy seen 9/22/21 bone marrow - 0.02% phenotypically abnormal cells detected. Stem cell collection with Dr. Ricks on 10/10-10/30/21 -  Advise dot have stem cell transplant with she has postponed.   Patient is here for follow up Bone marrow (3/2023) shows no evidence of MM DIscussed about the findings and options including continued revlimid vs SCT vs observation She opted for observation -Labs are drawn in the office, reviewed, analyzed, and discussed She's clinically doing well except for pruritic rash on her left upper middle arm - rash slowly better. Explained that it might be secondary to allergies and to follow up with derm if not improved. -She'll go on a cruise to [place] with her sister later this month. to call and review labs in a few days.   #screenings: Advised to have DEXA scan and mammograms  # Weight loss - improved Unintentional PET/CT (6/21)- Mediastinal lymphadenopathy Had mediastinoscopy and lymph node biopsy Path: non-necrotizing granulomatous lymphadenitis with sclerosis D/D at this time would be MM vs drug induced vs ?sarcoidosis appetite is improved with weight gained.  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 Smith  Labs in 2 months CBC, CMP, myeloma panel OV few days later.

## 2024-10-25 NOTE — CONSULT LETTER
[Dear  ___] : Dear  [unfilled], [Courtesy Letter:] : I had the pleasure of seeing your patient, [unfilled], in my office today. [Please see my note below.] : Please see my note below. [Sincerely,] : Sincerely, [FreeTextEntry3] : Ishan Smith MD, MPH\par  Attending Physician\par  Hematology Oncology\par  [facility] [place]\par  [facility]\par

## 2024-10-25 NOTE — CONSULT LETTER
[Dear  ___] : Dear  [unfilled], [Courtesy Letter:] : I had the pleasure of seeing your patient, [unfilled], in my office today. [Please see my note below.] : Please see my note below. [Sincerely,] : Sincerely, [FreeTextEntry3] : Ishan Smith MD, MPH\par  Attending Physician\par  Hematology Oncology\par  [facility] [place]\par  [facility]\par

## 2024-10-25 NOTE — PHYSICAL EXAM
[Ambulatory and capable of all self care but unable to carry out any work activities] : Status 2- Ambulatory and capable of all self care but unable to carry out any work activities. Up and about more than 50% of waking hours [Normal] : affect appropriate [de-identified] : Atopic dermatitis nape of neck, left elbow

## 2024-10-25 NOTE — BEGINNING OF VISIT
[0] : 2) Feeling down, depressed, or hopeless: Not at all (0) [PHQ-2 Negative] : PHQ-2 Negative [Pain Scale: ___] : On a scale of 1-10, today the patient's pain is a(n) [unfilled]. [Former] : Former [15-19] : 15-19 [> 15 Years] : > 15 Years [Date Discussed (MM/DD/YY): ___] : Discussed: [unfilled] [Reviewed updated] : Reviewed updated [[id number]] : 0

## 2024-10-25 NOTE — PHYSICAL EXAM
[Ambulatory and capable of all self care but unable to carry out any work activities] : Status 2- Ambulatory and capable of all self care but unable to carry out any work activities. Up and about more than 50% of waking hours [Normal] : affect appropriate [de-identified] : Atopic dermatitis nape of neck, left elbow

## 2024-10-25 NOTE — HISTORY OF PRESENT ILLNESS
[de-identified] : Ms Marshall is a 61 year old female recently seen as inpatient consultation for PE, here for follow up  SHe was admitted to [facility] (19-19) palpitations found to have saddle PE CT angiogram: Saddle embolus with extensive pulmonary emboli extending throughout the right and left pulmonary arterial vasculature  She was noted to also have hypertensive urgency with elevated BP of 214/135. On labs,  an elevated D-dimer to 9.44 was noted which was suspicious for pulmonary embolism. On CT angiography of the chest she was noted to have a saddle embolus with extensive pulmonary emboli extending throughout the right and left pulmonary arterial vasculature. Patient was evaluated by Cardiology and an echocardiogram was done. She was admitted to the ICU for close monitoring and management. She was started on anticoagulation treatment for the pulmonary embolism with Lovenox 80 mg. Once she clinically improved with resolution of the palpitations and shortness of breath, and she remained hemodynamically stable with good oxygen saturations on room air, she was switched from Lovenox to Eliquis 10mg orally twice daily for the treatment of the pulmonary embolism. For the hypertensive urgency with elevated BPs, Cardiology adjusted patient's BP medications - added Amlodipine 5mg daily and Carvedilol 12.5mg orally twice daily. Patient's home Losartan was held during her stay because of the acute kidney injury. Patient's BPs became more controlled and remained stable after the medication adjustments. Patient was kept on IV fluids for the acute kidney injury which resolved. Patient noted chronic history of double vision resulting from right eye droopiness, but reported that she had recently had full neurological and ophthalmological workup done including CT scans and MRIs, which were all negative. This was confirmed by her PMD as well. Patient mentioned that she was prescribed special glasses by Neuro-opthalmology to wear to correct the double vision. She was instructed to continue with the glasses and to follow up with the Neuro-opthalmologist as outpatient. Because she did not have any neurological deficits, it was not deemed necessary to do further workup while inpatient. Patient will need to follow up with her PMD to evaluate whether she will need further workup.  She remained clinically and hemodynamically stable, and was deemed ready for discharge on .   She was discharged on eliquis  SHe is retired but continues to live a healthy life- stays active with her household chores and does yoga Denies any long distance travel, immobilization prior to the event No recent surgeries or hospitalization  No personal or family ho VTEs or miscarriages  She is ex smoker - quit 28 years back. SMoked 1/2 ppd  x 15 years  Hgb 9.8 WBC 3.5  SIFx: One IgG Kappa Band and One Weak Free Kappa Light Chain  Identified  SPEP: M-spike 1: 1.3 g/dl; Mspike 2: unable to quantitate FLCR: 85.80  FActor V Leiden mutation, Prothrombin gene mutation, APS panel: negative Serum viscosity: 1.7  Free protein S Antigen: 44 AT3 A AT3 activity: 124  D Dimer: 9.44 -> 2.17  Bone marrow core biopsy, clot sections and aspirate smears.(19) Plasma cell neoplasm, IgG Kappa, 5-8% of the bone marrow elements, see comment. Adequately maturing myeloid elements; slight erythroid hyperplasia. No evidence of overt dysplasia. No evidence of amyloid deposition. Normal karyotype; no aberrations detected by FISH analysis.  Bone marrow- 10% plasma cells Negative FISH for myeloma  2019 PET/CT without any bone metastases  SHe was recently admitted to [facility] (20-20) for fever, generalized weakness, malaise, bodyaches and nausea. Nasal swab was negative for flu and RSV. Pancultures were negative. SHe was empirically treated with levaquin pending cultures and was subsequently was discharged home  Reports that she fell on her way to the office. No head injury. Scraped her left knee and fingers with minimal bruise, She was bleeding for left fingers s/p dressing  She reports 3 month ho intermittent tingling and numbness in fingers and toes. For the past 2 months she has been dropping things and also has wobbling gait. She somehow did not mention during previous clinic visits She reports being very active prior- was performing yoga regularly SHe now walks with a cane SHe was recently admitted to [facility] (20-20) for fever, generalized weakness, malaise, bodyaches and nausea. Nasal swab was negative for flu and RSV. Pancultures were negative. SHe was empirically treated with levaquin pending cultures and was subsequently was discharged home A week later she fell on her way to the office. No head injury. Scraped her left knee and fingers with minimal bruise, She was bleeding for left fingers s/p dressing  She reports 3 month ho intermittent tingling and numbness in fingers and toes -> Today she reports no tingling and numbness x 1 week  For the past 2 months she has been dropping things and also has wobbling gait- she still drop things but not as often. Gait is back to normal  No longer needs a cane   She saw Dr. Cyril Ricks at [facility] on 12/3/20, was given pamphlet to read and to follow up for stem cell transplant.  She is agreeable to have stem cell collection. May not opt for transplant upfront She will follow up with Dr Ricks  Bone marrow biopsy, bone marrow clot, and bone marrow aspirate: -Markedly hypocellular bone marrow with erythroid predominance and focal/minimal trilineage hematopoiesis with maturation. -Iron stores are present. MRD was not sent  21 [facility] flow cytometry 0.02% phenotypically abnormal cells detected.  Monoclonal plasma cell phenotype, kappa positive.   Bone marrow biopsy, clot and bone marrow aspirate -normocellular bone marrow (overall 30% cellularity) with erythroid - predominantl trilineage hematopoiesis and maturation, iron stores presents. 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            AFB stain:  No mycobacteria identified.            GMS stain:  No fungi identified. C.  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         AFB stain:  No mycobacteria identified.         GMS stain:  No fungi identified.  PET/CT (2021): No lytic bone lesion Multiple new symmetric hypermetabolic slightly enlarged mediastinal and bilateral hilar lymph nodes, for example: -Para-aortic, 2.2 x 0.9 cm, SUV 11.4 107 -Subcarinal, 2.9 x 1.1 cm, SUV 16.3,  -Anterior upper mediastinum, 1.4 x 0.5 cm, SUV 15.0, SUV 98 -Right hilum, up to SUV 14.0 -Left hilum, up to SUV 13.9.  2024 Pet/CT 1. No osseous lesions suspicious for FDG avid malignancy.  2. 3.1 x 2.3 cm FDG avid exophytic mass at the distal pancreatic body/proximal tail, SUV max 3.3, unchanged in size since . A neoplastic lesion cannot be excluded. Further evaluation with MRI is recommended.  3. Mild, nonspecific uptake in a normal-sized subcarinal node. Otherwise, since , there has been interval resolution of hypermetabolic mediastinal and bilateral hilar lymphadenopathy.  4. Left total hip arthroplasty with muscle swelling about the left hip and possible fluid density. There is associated increased, inflammatory uptake, possibly postsurgical in nature. Correlate clinically.  5. Mildly FDG avid presacral edema and lower rectal wall thickening, probably representing proctitis or hemorrhoids.  Correlate clinically  [de-identified] : She is seen today for follow up  s/p RVD (9/25/19 - 1/29/21) On Revlimid maintenance ( 2/5/21 - present) - off since Sept due to stem cell collection - resumed in Jan 2022 Lower dose Revlimid 10 mg daily x 21 days on 5/5/022 - 5/31/22 -  6/3/22, restarted August 2022.  Revlimid held since 12/2022 due to Covid and worsening anemia.   Overall feels well. c/o itchy rash over the back, left elbow She has not yet had MRI abdomen to assess pancreatic mass Reports that it is scheduled for Nov 4th.  Upon enquiry, her PCP is not Dr Tammy Oseguera. [facility] now does not have a P[facility] Strongly advised to see PCP ASAP and be uptodate with age appropriate medical care and cancer screening  Weight - 151 -> 143 lbs -> 140 lbs -> 135 lbs  -139 lbs -> 140 lbs -> 138 lbs -> 132 lbs -> 141 lbs  -> 147 lbs -> 150 lbs -> 156 lbs -> -> 153--> 158--> 160 lbs -> 159 lbs -> 162 lbs  -> 152 lbs (Covid)  -> 161 -> hip surgery--> 145 lbs

## 2024-10-25 NOTE — CONSULT LETTER
[Dear  ___] : Dear  [unfilled], [Courtesy Letter:] : I had the pleasure of seeing your patient, [unfilled], in my office today. [Please see my note below.] : Please see my note below. [Sincerely,] : Sincerely, [FreeTextEntry3] : Ishan Smith MD, MPH\par  Attending Physician\par  Hematology Oncology\par  [facility] [place]\par  [facility]\par

## 2024-10-25 NOTE — BEGINNING OF VISIT
[0] : 2) Feeling down, depressed, or hopeless: Not at all (0) [PHQ-2 Negative] : PHQ-2 Negative [Pain Scale: ___] : On a scale of 1-10, today the patient's pain is a(n) [unfilled]. [Former] : Former [15-19] : 15-19 [> 15 Years] : > 15 Years [Date Discussed (MM/DD/YY): ___] : Discussed: [unfilled] [Reviewed updated] : Reviewed updated [[id number]] : 0

## 2024-10-25 NOTE — HISTORY OF PRESENT ILLNESS
[de-identified] : Ms Marshall is a 61 year old female recently seen as inpatient consultation for PE, here for follow up  SHe was admitted to [facility] (19-19) palpitations found to have saddle PE CT angiogram: Saddle embolus with extensive pulmonary emboli extending throughout the right and left pulmonary arterial vasculature  She was noted to also have hypertensive urgency with elevated BP of 214/135. On labs,  an elevated D-dimer to 9.44 was noted which was suspicious for pulmonary embolism. On CT angiography of the chest she was noted to have a saddle embolus with extensive pulmonary emboli extending throughout the right and left pulmonary arterial vasculature. Patient was evaluated by Cardiology and an echocardiogram was done. She was admitted to the ICU for close monitoring and management. She was started on anticoagulation treatment for the pulmonary embolism with Lovenox 80 mg. Once she clinically improved with resolution of the palpitations and shortness of breath, and she remained hemodynamically stable with good oxygen saturations on room air, she was switched from Lovenox to Eliquis 10mg orally twice daily for the treatment of the pulmonary embolism. For the hypertensive urgency with elevated BPs, Cardiology adjusted patient's BP medications - added Amlodipine 5mg daily and Carvedilol 12.5mg orally twice daily. Patient's home Losartan was held during her stay because of the acute kidney injury. Patient's BPs became more controlled and remained stable after the medication adjustments. Patient was kept on IV fluids for the acute kidney injury which resolved. Patient noted chronic history of double vision resulting from right eye droopiness, but reported that she had recently had full neurological and ophthalmological workup done including CT scans and MRIs, which were all negative. This was confirmed by her PMD as well. Patient mentioned that she was prescribed special glasses by Neuro-opthalmology to wear to correct the double vision. She was instructed to continue with the glasses and to follow up with the Neuro-opthalmologist as outpatient. Because she did not have any neurological deficits, it was not deemed necessary to do further workup while inpatient. Patient will need to follow up with her PMD to evaluate whether she will need further workup.  She remained clinically and hemodynamically stable, and was deemed ready for discharge on .   She was discharged on eliquis  SHe is retired but continues to live a healthy life- stays active with her household chores and does yoga Denies any long distance travel, immobilization prior to the event No recent surgeries or hospitalization  No personal or family ho VTEs or miscarriages  She is ex smoker - quit 28 years back. SMoked 1/2 ppd  x 15 years  Hgb 9.8 WBC 3.5  SIFx: One IgG Kappa Band and One Weak Free Kappa Light Chain  Identified  SPEP: M-spike 1: 1.3 g/dl; Mspike 2: unable to quantitate FLCR: 85.80  FActor V Leiden mutation, Prothrombin gene mutation, APS panel: negative Serum viscosity: 1.7  Free protein S Antigen: 44 AT3 A AT3 activity: 124  D Dimer: 9.44 -> 2.17  Bone marrow core biopsy, clot sections and aspirate smears.(19) Plasma cell neoplasm, IgG Kappa, 5-8% of the bone marrow elements, see comment. Adequately maturing myeloid elements; slight erythroid hyperplasia. No evidence of overt dysplasia. No evidence of amyloid deposition. Normal karyotype; no aberrations detected by FISH analysis.  Bone marrow- 10% plasma cells Negative FISH for myeloma  2019 PET/CT without any bone metastases  SHe was recently admitted to [facility] (20-20) for fever, generalized weakness, malaise, bodyaches and nausea. Nasal swab was negative for flu and RSV. Pancultures were negative. SHe was empirically treated with levaquin pending cultures and was subsequently was discharged home  Reports that she fell on her way to the office. No head injury. Scraped her left knee and fingers with minimal bruise, She was bleeding for left fingers s/p dressing  She reports 3 month ho intermittent tingling and numbness in fingers and toes. For the past 2 months she has been dropping things and also has wobbling gait. She somehow did not mention during previous clinic visits She reports being very active prior- was performing yoga regularly SHe now walks with a cane SHe was recently admitted to [facility] (20-20) for fever, generalized weakness, malaise, bodyaches and nausea. Nasal swab was negative for flu and RSV. Pancultures were negative. SHe was empirically treated with levaquin pending cultures and was subsequently was discharged home A week later she fell on her way to the office. No head injury. Scraped her left knee and fingers with minimal bruise, She was bleeding for left fingers s/p dressing  She reports 3 month ho intermittent tingling and numbness in fingers and toes -> Today she reports no tingling and numbness x 1 week  For the past 2 months she has been dropping things and also has wobbling gait- she still drop things but not as often. Gait is back to normal  No longer needs a cane   She saw Dr. Cyril Ricks at [facility] on 12/3/20, was given pamphlet to read and to follow up for stem cell transplant.  She is agreeable to have stem cell collection. May not opt for transplant upfront She will follow up with Dr Ricks  Bone marrow biopsy, bone marrow clot, and bone marrow aspirate: -Markedly hypocellular bone marrow with erythroid predominance and focal/minimal trilineage hematopoiesis with maturation. -Iron stores are present. MRD was not sent  21 [facility] flow cytometry 0.02% phenotypically abnormal cells detected.  Monoclonal plasma cell phenotype, kappa positive.   Bone marrow biopsy, clot and bone marrow aspirate -normocellular bone marrow (overall 30% cellularity) with erythroid - predominantl trilineage hematopoiesis and maturation, iron stores presents. 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            AFB stain:  No mycobacteria identified.            GMS stain:  No fungi identified. C.  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         AFB stain:  No mycobacteria identified.         GMS stain:  No fungi identified.  PET/CT (2021): No lytic bone lesion Multiple new symmetric hypermetabolic slightly enlarged mediastinal and bilateral hilar lymph nodes, for example: -Para-aortic, 2.2 x 0.9 cm, SUV 11.4 107 -Subcarinal, 2.9 x 1.1 cm, SUV 16.3,  -Anterior upper mediastinum, 1.4 x 0.5 cm, SUV 15.0, SUV 98 -Right hilum, up to SUV 14.0 -Left hilum, up to SUV 13.9.  2024 Pet/CT 1. No osseous lesions suspicious for FDG avid malignancy.  2. 3.1 x 2.3 cm FDG avid exophytic mass at the distal pancreatic body/proximal tail, SUV max 3.3, unchanged in size since . A neoplastic lesion cannot be excluded. Further evaluation with MRI is recommended.  3. Mild, nonspecific uptake in a normal-sized subcarinal node. Otherwise, since , there has been interval resolution of hypermetabolic mediastinal and bilateral hilar lymphadenopathy.  4. Left total hip arthroplasty with muscle swelling about the left hip and possible fluid density. There is associated increased, inflammatory uptake, possibly postsurgical in nature. Correlate clinically.  5. Mildly FDG avid presacral edema and lower rectal wall thickening, probably representing proctitis or hemorrhoids.  Correlate clinically  [de-identified] : She is seen today for follow up  s/p RVD (9/25/19 - 1/29/21) On Revlimid maintenance ( 2/5/21 - present) - off since Sept due to stem cell collection - resumed in Jan 2022 Lower dose Revlimid 10 mg daily x 21 days on 5/5/022 - 5/31/22 -  6/3/22, restarted August 2022.  Revlimid held since 12/2022 due to Covid and worsening anemia.   Overall feels well. c/o itchy rash over the back, left elbow She has not yet had MRI abdomen to assess pancreatic mass Reports that it is scheduled for Nov 4th.  Upon enquiry, her PCP is not Dr Tammy Oseguera. [facility] now does not have a P[facility] Strongly advised to see PCP ASAP and be uptodate with age appropriate medical care and cancer screening  Weight - 151 -> 143 lbs -> 140 lbs -> 135 lbs  -139 lbs -> 140 lbs -> 138 lbs -> 132 lbs -> 141 lbs  -> 147 lbs -> 150 lbs -> 156 lbs -> -> 153--> 158--> 160 lbs -> 159 lbs -> 162 lbs  -> 152 lbs (Covid)  -> 161 -> hip surgery--> 145 lbs

## 2024-10-25 NOTE — PHYSICAL EXAM
[Ambulatory and capable of all self care but unable to carry out any work activities] : Status 2- Ambulatory and capable of all self care but unable to carry out any work activities. Up and about more than 50% of waking hours [Normal] : affect appropriate [de-identified] : Atopic dermatitis nape of neck, left elbow

## 2025-01-21 NOTE — ASSESSMENT
[FreeTextEntry1] : >> Imaging and Other Studies   I personally reviewed the relevant imaging.  Discussed and explained to patient the likely source of pathology and pain.  Questions answered. MRI, CT  >> Therapy and Other Modalities   n/a  >> Medications  gabapentin uptitrate to TID cautioned change in mood.  Encouraged to call with any worsening mood or depression/suicidal ideations  restart flexeril 10mg prn  >> Interventions THR  >> Consults   referral to DR Baron for BATSHEVA  >> Discussion of Risks/Benefits/Alternatives    >Regarding any scheduled procedures:   In the event the patient is scheduled for a procedure,   I have discussed in detail with the patient that any interventional pain procedure is associated with potential risks.  The procedure may include an injection of steroids and potentially other medications (local anesthetic and normal saline) into the epidural space or surrounding tissue of the spine.  There are significant risks of this procedure which include and are not limited to infection, bleeding, worsening pain, dural puncture leading to postdural puncture headache, nerve damage, spinal cord injury, paralysis, stroke, and death.   There is a chance that the procedure does not improve their pain.   There are risks associated with the steroid being absorbed into the body systemically.  These include dysphoria, difficulty sleeping, mood swings and personality changes.  Premenopausal women may notice an irregularity in her menstrual cycle for 2-3 months following the injection.  Steroids can specifically affect patients with hypertension, diabetes, and peptic ulcers.  The procedure may cause a temporary increase in blood pressure and blood pressure, and may adversely affect a peptic ulcer.  Other, more rare complications, include avascular necrosis of joints, glaucoma and worsening of osteoporosis.   I have discussed the risks of the procedure at length with the patient, and the potential benefits of pain relief.  I have offered alternatives to the procedure.  All questions were answered.   The patient expressed understanding and wishes to proceed with the procedure.   > Longitudinal management of Complex Painful condition   The patient is being managed for a complex condition that requires ongoing management.  The nature of this condition demands nuanced approach to treatment.  The seriousness of the condition necessitates an in-depth and focused approach to management and coordination with other healthcare professionals.    This visit involves intricate evaluation and management of the patient's condition.  The complexity of the visit was due to the need for detailed assessment of the current state, consideration of potential complications and a careful balancing of treatment options to management the chronic condition effectively.   As detailed above, the patient has a chronic significant painful condition that requires regular and detailed management.  The condition's impact on the patient's quality of life and health is substantial and necessitates a comprehensive and tailored approach   >> Conclusion   There were no barriers to communication. Informed patient that I would be available for any additional questions. Patient was instructed to call with any worsening symptoms including severe pain, new numbness/weakness, or changes in the bowel/bladder function. Discussed role of nsaids in pain management and all relevant risks, if patient is continuing to require after 4 weeks the patient should f/u for alternative treatment. Instructed patient to maintain pain diary to monitor pain level, mobility, and function.

## 2025-01-21 NOTE — HISTORY OF PRESENT ILLNESS
[Joint Pain] : joint  [___ mths] : [unfilled] month(s) ago [Constant] : constant [10] : a maximum pain level of 10/10 [Sharp] : sharp [Burning] : burning [Stabbing] : stabbing [Sitting] : sitting [Standing] : standing [Walking] : walking [Bending] : bending [Medications] : medications [Heat] : heat [Ice] : ice [FreeTextEntry1] : Interval Note:  Reports significant pain over right hip.  Pain is so bad that patient finds it difficult to perform adls and ambulate .  She denies medication adverse effects. Denies any additional weakness, numbness, bowel/bladder dysfunction.     HPI    Ms. 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 C/o groin pain bilaterally, radiates to left anterior leg. Pain worst to left side. Difficult to walk and transition. Has seen multiple ortho and spine specialists. Second opinion with ortho on Friday. Recent ED visit due to severity of pain. No relief for OTC medications or muscle relaxants. Taking prn Roxicodone with some relief. Denies any additional weakness, numbness, bowel/bladder dysfunction, history of bleeding disorders.   Previous and current pain medications/doses/effects: Flexeril, Tylenol, Oxycodone     Previous Pain Treatments: none     Previous Pain Injections: none     Previous Diagnostic Studies/Images:  03/01/24					 Exam: 	MRI LUMBAR SPINE					 Order#:	MRI [id number]					                CLINICAL INFORMATION: Low back pain  ADDITIONAL CLINICAL INFORMATION: Not Applicable  TECHNIQUE: Multiplanar, multisequence MRI was performed of the lumbar spine. IV Contrast: NONE  PRIOR STUDIES: No priors available for comparison.     FINDINGS:    LOCALIZER: No additional findings. BONES: There is no fracture or bone marrow edema. ALIGNMENT: The alignment is normal.   SACROILIAC JOINTS/SACRUM: There is no sacral fracture. The SI joints are partially visualized but are intact. CONUS AND CAUDA EQUINA: The distal cord and conus are normal in signal. Conus terminates at L1.   VISUALIZED INTRAPELVIC/INTRA-ABDOMINAL SOFT TISSUES: Normal. PARASPINAL SOFT TISSUES: Normal.   INDIVIDUAL LEVELS:    LOWER THORACIC SPINE: No spinal canal or neuroforaminal stenosis.  L1-L2: No spinal canal or neuroforaminal stenosis. L2-L3: Small posterior disc bulge and mild bilateral facet arthropathy result in mild to moderate bilateral neural foraminal narrowing and mild central canal narrowing. There is bilateral lateral recess stenosis. L3-L4: Broad-based posterior disc bulge and moderate bilateral facet arthropathy result in mild to moderate bilateral neural foraminal narrowing and mild to moderate central canal narrowing. There is bilateral lateral recess stenosis. L4-L5: Broad-based posterior disc bulge with superimposed left intraforaminal protrusion in conjunction with moderate bilateral facet arthropathy results in moderate bilateral neural foraminal narrowing and mild to moderate central canal narrowing. There is bilateral lateral recess stenosis. L5-S1: Broad-based posterior disc bulge and mild bilateral facet arthropathy result in mild right neural foraminal narrowing, mild to moderate left neural foraminal narrowing but no significant central canal narrowing.      IMPRESSION:  Multilevel discogenic degenerative disease and facet arthropathy of the lumbar spine, most pronounced at L4-5 where there is moderate bilateral neural foraminal narrowing and mild to moderate central canal narrowing. Additional varying degrees of central canal and neural foraminal narrowing, as above   ======================================================================================  03/01/24					 Exam: 	MRI HIP LT					 Order#:	MRI [id number]					                History: Low back and left hip pain  Technique: Magnetic resonance imaging of the left hip was performed without intravenous contrast according to standard protocol.  Comparison: None available     Findings:  There is serpiginous marrow signal abnormality adjacent to the contour of the femoral head subchondral bone, in keeping with avascular necrosis there is associated mild to moderate edema without collapse of the articular surface at this time. There is a moderate joint effusion. No significant cartilage loss. There is truncated tearing of the anterosuperior and superolateral labrum. The gluteus medius and minimus tendons are maintained.  There is no greater trochanteric bursitis or iliopsoas bursitis. The fat planes surrounding the sciatic nerve are preserved. The hamstring origins are maintained.  There is mild atrophy of the gluteal musculature.  The partially visualized intrapelvic organs are grossly within normal limits noting mild bladder wall thickening.   Impression:  Avascular necrosis of the left femoral head and associated moderate marrow edema. There is no collapse of the articular surface at this time.   [FreeTextEntry7] : Yung hips  [FreeTextEntry4] : tylenol

## 2025-01-24 NOTE — ASSESSMENT
[Reviewed updated] : Reviewed updated [Designated Health Care Proxy] : Designated Health Care Proxy [Name: ___] : Name: [unfilled] [Relationship: ___] : Relationship: [unfilled] [Full Code] : full code [FreeTextEntry1] : # Multiple myeloma One IgG Kappa Band and One Weak Free Kappa Light Chain FLCR 125, renal failure -> Based on IMWG definition- this is multiple myeloma Bone marrow shows 10% clonal plasma cells 9/26/2019 PET/CT without any bone metastases VD (9/25/19-1/21); Revlimid 25 mg 2 weeks on 1 week off since 10/21/2019. No aspirin as she is already on eliquis Acyclovir BID She has been on RVD ~16 months Bone marrow (1/21)- No evidence of myeloma. MRD was not sent Was in Complete remission 7/7/20-4/2021 Worsening Leukopenia and thrombocytopenia but no monoclonal gammopathy seen 9/22/21 bone marrow - 0.02% phenotypically abnormal cells detected. Stem cell collection with Dr. Ricks on 10/10-10/30/21 - Advise dot have stem cell transplant with she has postponed. Bone marrow (3/2023) shows no evidence of MM 7/29/2024 Pet/CT 1. No osseous lesions suspicious for FDG avid malignancy.  2. 3.1 x 2.3 cm FDG avid exophytic mass at the distal pancreatic body/proximal tail, SUV max 3.3, unchanged in size since 2021. A neoplastic lesion cannot be excluded. Further evaluation with MRI is recommended.  3. Mild, nonspecific uptake in a normal-sized subcarinal node. Otherwise, since 2021, there has been interval resolution of hypermetabolic mediastinal and bilateral hilar lymphadenopathy.  4. Left total hip arthroplasty with muscle swelling about the left hip and possible fluid density. There is associated increased, inflammatory uptake, possibly postsurgical in nature. Correlate clinically.  5. Mildly FDG avid presacral edema and lower rectal wall thickening, probably representing proctitis or hemorrhoids.  Correlate clinically  Other than right hip pain (needing hip replacement), she's doing well. She'll follow up with ortho.  Advised to take decongestant clear productive cough and seek medical attention for fever. Lungs are clear and afebrile today.  Labs ordered, drawn in the office, reviewed, analyzed and discussed continue to monitor and will f/u with labs   ##Pancreatic mass  She was supposed to obtain MRI of abdomen prior to the visit 11/2024 MRI - A 2.9 cm left adrenal adenoma abutting the pancreatic tail is stable in size dating back to 2018 to continue to monitor   #screenings: Advised to be uptodate with age appropriate cancer screening  # Weight loss - improved Unintentional PET/CT (6/21)- Mediastinal lymphadenopathy Had mediastinoscopy and lymph node biopsy Path: non-necrotizing granulomatous lymphadenitis with sclerosis D/D at this time would be MM vs drug induced vs ?sarcoidosis appetite is improved with weight gained.  #Saddle PE Thrombophilia suggestive of persistent low protein S antigen On eliquis Long term anticoagulation  Follow-up in 2 months CBC, CMP, MM, ferritin, iron panel, b12/folate  [AdvancecareDate] : 10/25/24

## 2025-01-24 NOTE — PHYSICAL EXAM
[Ambulatory and capable of all self care but unable to carry out any work activities] : Status 2- Ambulatory and capable of all self care but unable to carry out any work activities. Up and about more than 50% of waking hours [de-identified] : Atopic dermatitis nape of neck, left elbow [Normal] : normal appearance, no rash, nodules, vesicles, ulcers, erythema [de-identified] : erin with cane

## 2025-01-24 NOTE — HISTORY OF PRESENT ILLNESS
[de-identified] : Ms Marshall is a 61 year old female recently seen as inpatient consultation for PE, here for follow up  SHe was admitted to [facility] (19-19) palpitations found to have saddle PE CT angiogram: Saddle embolus with extensive pulmonary emboli extending throughout the right and left pulmonary arterial vasculature  She was noted to also have hypertensive urgency with elevated BP of 214/135. On labs,  an elevated D-dimer to 9.44 was noted which was suspicious for pulmonary embolism. On CT angiography of the chest she was noted to have a saddle embolus with extensive pulmonary emboli extending throughout the right and left pulmonary arterial vasculature. Patient was evaluated by Cardiology and an echocardiogram was done. She was admitted to the ICU for close monitoring and management. She was started on anticoagulation treatment for the pulmonary embolism with Lovenox 80 mg. Once she clinically improved with resolution of the palpitations and shortness of breath, and she remained hemodynamically stable with good oxygen saturations on room air, she was switched from Lovenox to Eliquis 10mg orally twice daily for the treatment of the pulmonary embolism. For the hypertensive urgency with elevated BPs, Cardiology adjusted patient's BP medications - added Amlodipine 5mg daily and Carvedilol 12.5mg orally twice daily. Patient's home Losartan was held during her stay because of the acute kidney injury. Patient's BPs became more controlled and remained stable after the medication adjustments. Patient was kept on IV fluids for the acute kidney injury which resolved. Patient noted chronic history of double vision resulting from right eye droopiness, but reported that she had recently had full neurological and ophthalmological workup done including CT scans and MRIs, which were all negative. This was confirmed by her PMD as well. Patient mentioned that she was prescribed special glasses by Neuro-opthalmology to wear to correct the double vision. She was instructed to continue with the glasses and to follow up with the Neuro-opthalmologist as outpatient. Because she did not have any neurological deficits, it was not deemed necessary to do further workup while inpatient. Patient will need to follow up with her PMD to evaluate whether she will need further workup.  She remained clinically and hemodynamically stable, and was deemed ready for discharge on .   She was discharged on eliquis  SHe is retired but continues to live a healthy life- stays active with her household chores and does yoga Denies any long distance travel, immobilization prior to the event No recent surgeries or hospitalization  No personal or family ho VTEs or miscarriages  She is ex smoker - quit 28 years back. SMoked 1/2 ppd  x 15 years  Hgb 9.8 WBC 3.5  SIFx: One IgG Kappa Band and One Weak Free Kappa Light Chain  Identified  SPEP: M-spike 1: 1.3 g/dl; Mspike 2: unable to quantitate FLCR: 85.80  FActor V Leiden mutation, Prothrombin gene mutation, APS panel: negative Serum viscosity: 1.7  Free protein S Antigen: 44 AT3 A AT3 activity: 124  D Dimer: 9.44 -> 2.17  Bone marrow core biopsy, clot sections and aspirate smears.(19) Plasma cell neoplasm, IgG Kappa, 5-8% of the bone marrow elements, see comment. Adequately maturing myeloid elements; slight erythroid hyperplasia. No evidence of overt dysplasia. No evidence of amyloid deposition. Normal karyotype; no aberrations detected by FISH analysis.  Bone marrow- 10% plasma cells Negative FISH for myeloma  2019 PET/CT without any bone metastases  SHe was recently admitted to [facility] (20-20) for fever, generalized weakness, malaise, bodyaches and nausea. Nasal swab was negative for flu and RSV. Pancultures were negative. SHe was empirically treated with levaquin pending cultures and was subsequently was discharged home  Reports that she fell on her way to the office. No head injury. Scraped her left knee and fingers with minimal bruise, She was bleeding for left fingers s/p dressing  She reports 3 month ho intermittent tingling and numbness in fingers and toes. For the past 2 months she has been dropping things and also has wobbling gait. She somehow did not mention during previous clinic visits She reports being very active prior- was performing yoga regularly SHe now walks with a cane SHe was recently admitted to [facility] (20-20) for fever, generalized weakness, malaise, bodyaches and nausea. Nasal swab was negative for flu and RSV. Pancultures were negative. SHe was empirically treated with levaquin pending cultures and was subsequently was discharged home A week later she fell on her way to the office. No head injury. Scraped her left knee and fingers with minimal bruise, She was bleeding for left fingers s/p dressing  She reports 3 month ho intermittent tingling and numbness in fingers and toes -> Today she reports no tingling and numbness x 1 week  For the past 2 months she has been dropping things and also has wobbling gait- she still drop things but not as often. Gait is back to normal  No longer needs a cane   She saw Dr. Cyril Ricks at [facility] on 12/3/20, was given pamphlet to read and to follow up for stem cell transplant.  She is agreeable to have stem cell collection. May not opt for transplant upfront She will follow up with Dr Ricks  Bone marrow biopsy, bone marrow clot, and bone marrow aspirate: -Markedly hypocellular bone marrow with erythroid predominance and focal/minimal trilineage hematopoiesis with maturation. -Iron stores are present. MRD was not sent  21 [facility] flow cytometry 0.02% phenotypically abnormal cells detected.  Monoclonal plasma cell phenotype, kappa positive.   Bone marrow biopsy, clot and bone marrow aspirate -normocellular bone marrow (overall 30% cellularity) with erythroid - predominantl trilineage hematopoiesis and maturation, iron stores presents. 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            AFB stain:  No mycobacteria identified.            GMS stain:  No fungi identified. C.  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         AFB stain:  No mycobacteria identified.         GMS stain:  No fungi identified.  PET/CT (2021): No lytic bone lesion Multiple new symmetric hypermetabolic slightly enlarged mediastinal and bilateral hilar lymph nodes, for example: -Para-aortic, 2.2 x 0.9 cm, SUV 11.4 107 -Subcarinal, 2.9 x 1.1 cm, SUV 16.3,  -Anterior upper mediastinum, 1.4 x 0.5 cm, SUV 15.0, SUV 98 -Right hilum, up to SUV 14.0 -Left hilum, up to SUV 13.9.  2024 Pet/CT 1. No osseous lesions suspicious for FDG avid malignancy.  2. 3.1 x 2.3 cm FDG avid exophytic mass at the distal pancreatic body/proximal tail, SUV max 3.3, unchanged in size since . A neoplastic lesion cannot be excluded. Further evaluation with MRI is recommended.  3. Mild, nonspecific uptake in a normal-sized subcarinal node. Otherwise, since , there has been interval resolution of hypermetabolic mediastinal and bilateral hilar lymphadenopathy.  4. Left total hip arthroplasty with muscle swelling about the left hip and possible fluid density. There is associated increased, inflammatory uptake, possibly postsurgical in nature. Correlate clinically.  5. Mildly FDG avid presacral edema and lower rectal wall thickening, probably representing proctitis or hemorrhoids.  Correlate clinically  [de-identified] : She is seen today for follow up  s/p RVD (9/25/19 - 1/29/21) On Revlimid maintenance ( 2/5/21 - present) - off since Sept due to stem cell collection - resumed in Jan 2022 Lower dose Revlimid 10 mg daily x 21 days on 5/5/022 - 5/31/22 -  6/3/22, restarted August 2022.  Revlimid held since 12/2022 due to Covid and worsening anemia.   patient with right hip pain, seen by ortho and pain management. Given Flexeril for pain prn and wants to hold hop hip replacement after her birthday Reports of not eating so well but weight is holding. She had a cold a few days ago with temp 100.5 and clear productive cough. Fever resolved but cough lingering.  Constipation improved with increasing hydration and MiraLAX prn.   Weight - 151 -> 143 lbs -> 140 lbs -> 135 lbs  -139 lbs -> 140 lbs -> 138 lbs -> 132 lbs -> 141 lbs  -> 147 lbs -> 150 lbs -> 156 lbs -> -> 153--> 158--> 160 lbs -> 159 lbs -> 162 lbs  -> 152 lbs (Covid)  -> 161 -> hip surgery--> 145 lbs -> 151 lbs

## 2025-01-24 NOTE — CONSULT LETTER
[Dear  ___] : Dear  [unfilled], [Courtesy Letter:] : I had the pleasure of seeing your patient, [unfilled], in my office today. [Please see my note below.] : Please see my note below. [Sincerely,] : Sincerely, [FreeTextEntry3] : Ishan Smith MD, MPH\par  Attending Physician\par  Hematology Oncology\par  [facility] [place]\par  [facility]\par

## 2025-01-24 NOTE — CONSULT LETTER
[Dear  ___] : Dear  [unfilled], [Courtesy Letter:] : I had the pleasure of seeing your patient, [unfilled], in my office today. [Please see my note below.] : Please see my note below. [Sincerely,] : Sincerely, [FreeTextEntry3] : Ishan Smith MD, MPH\par  Attending Physician\par  Hematology Oncology\par  [facility] [place]\par  [facility]\par

## 2025-01-24 NOTE — PHYSICAL EXAM
[Ambulatory and capable of all self care but unable to carry out any work activities] : Status 2- Ambulatory and capable of all self care but unable to carry out any work activities. Up and about more than 50% of waking hours [de-identified] : Atopic dermatitis nape of neck, left elbow [Normal] : normal appearance, no rash, nodules, vesicles, ulcers, erythema [de-identified] : erin with cane

## 2025-01-24 NOTE — PHYSICAL EXAM
[Ambulatory and capable of all self care but unable to carry out any work activities] : Status 2- Ambulatory and capable of all self care but unable to carry out any work activities. Up and about more than 50% of waking hours [de-identified] : Atopic dermatitis nape of neck, left elbow [Normal] : normal appearance, no rash, nodules, vesicles, ulcers, erythema [de-identified] : erin with cane

## 2025-01-24 NOTE — HISTORY OF PRESENT ILLNESS
[de-identified] : Ms Marshall is a 61 year old female recently seen as inpatient consultation for PE, here for follow up  SHe was admitted to [facility] (19-19) palpitations found to have saddle PE CT angiogram: Saddle embolus with extensive pulmonary emboli extending throughout the right and left pulmonary arterial vasculature  She was noted to also have hypertensive urgency with elevated BP of 214/135. On labs,  an elevated D-dimer to 9.44 was noted which was suspicious for pulmonary embolism. On CT angiography of the chest she was noted to have a saddle embolus with extensive pulmonary emboli extending throughout the right and left pulmonary arterial vasculature. Patient was evaluated by Cardiology and an echocardiogram was done. She was admitted to the ICU for close monitoring and management. She was started on anticoagulation treatment for the pulmonary embolism with Lovenox 80 mg. Once she clinically improved with resolution of the palpitations and shortness of breath, and she remained hemodynamically stable with good oxygen saturations on room air, she was switched from Lovenox to Eliquis 10mg orally twice daily for the treatment of the pulmonary embolism. For the hypertensive urgency with elevated BPs, Cardiology adjusted patient's BP medications - added Amlodipine 5mg daily and Carvedilol 12.5mg orally twice daily. Patient's home Losartan was held during her stay because of the acute kidney injury. Patient's BPs became more controlled and remained stable after the medication adjustments. Patient was kept on IV fluids for the acute kidney injury which resolved. Patient noted chronic history of double vision resulting from right eye droopiness, but reported that she had recently had full neurological and ophthalmological workup done including CT scans and MRIs, which were all negative. This was confirmed by her PMD as well. Patient mentioned that she was prescribed special glasses by Neuro-opthalmology to wear to correct the double vision. She was instructed to continue with the glasses and to follow up with the Neuro-opthalmologist as outpatient. Because she did not have any neurological deficits, it was not deemed necessary to do further workup while inpatient. Patient will need to follow up with her PMD to evaluate whether she will need further workup.  She remained clinically and hemodynamically stable, and was deemed ready for discharge on .   She was discharged on eliquis  SHe is retired but continues to live a healthy life- stays active with her household chores and does yoga Denies any long distance travel, immobilization prior to the event No recent surgeries or hospitalization  No personal or family ho VTEs or miscarriages  She is ex smoker - quit 28 years back. SMoked 1/2 ppd  x 15 years  Hgb 9.8 WBC 3.5  SIFx: One IgG Kappa Band and One Weak Free Kappa Light Chain  Identified  SPEP: M-spike 1: 1.3 g/dl; Mspike 2: unable to quantitate FLCR: 85.80  FActor V Leiden mutation, Prothrombin gene mutation, APS panel: negative Serum viscosity: 1.7  Free protein S Antigen: 44 AT3 A AT3 activity: 124  D Dimer: 9.44 -> 2.17  Bone marrow core biopsy, clot sections and aspirate smears.(19) Plasma cell neoplasm, IgG Kappa, 5-8% of the bone marrow elements, see comment. Adequately maturing myeloid elements; slight erythroid hyperplasia. No evidence of overt dysplasia. No evidence of amyloid deposition. Normal karyotype; no aberrations detected by FISH analysis.  Bone marrow- 10% plasma cells Negative FISH for myeloma  2019 PET/CT without any bone metastases  SHe was recently admitted to [facility] (20-20) for fever, generalized weakness, malaise, bodyaches and nausea. Nasal swab was negative for flu and RSV. Pancultures were negative. SHe was empirically treated with levaquin pending cultures and was subsequently was discharged home  Reports that she fell on her way to the office. No head injury. Scraped her left knee and fingers with minimal bruise, She was bleeding for left fingers s/p dressing  She reports 3 month ho intermittent tingling and numbness in fingers and toes. For the past 2 months she has been dropping things and also has wobbling gait. She somehow did not mention during previous clinic visits She reports being very active prior- was performing yoga regularly SHe now walks with a cane SHe was recently admitted to [facility] (20-20) for fever, generalized weakness, malaise, bodyaches and nausea. Nasal swab was negative for flu and RSV. Pancultures were negative. SHe was empirically treated with levaquin pending cultures and was subsequently was discharged home A week later she fell on her way to the office. No head injury. Scraped her left knee and fingers with minimal bruise, She was bleeding for left fingers s/p dressing  She reports 3 month ho intermittent tingling and numbness in fingers and toes -> Today she reports no tingling and numbness x 1 week  For the past 2 months she has been dropping things and also has wobbling gait- she still drop things but not as often. Gait is back to normal  No longer needs a cane   She saw Dr. Cyril Ricks at [facility] on 12/3/20, was given pamphlet to read and to follow up for stem cell transplant.  She is agreeable to have stem cell collection. May not opt for transplant upfront She will follow up with Dr Ricks  Bone marrow biopsy, bone marrow clot, and bone marrow aspirate: -Markedly hypocellular bone marrow with erythroid predominance and focal/minimal trilineage hematopoiesis with maturation. -Iron stores are present. MRD was not sent  21 [facility] flow cytometry 0.02% phenotypically abnormal cells detected.  Monoclonal plasma cell phenotype, kappa positive.   Bone marrow biopsy, clot and bone marrow aspirate -normocellular bone marrow (overall 30% cellularity) with erythroid - predominantl trilineage hematopoiesis and maturation, iron stores presents. 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            AFB stain:  No mycobacteria identified.            GMS stain:  No fungi identified. C.  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         AFB stain:  No mycobacteria identified.         GMS stain:  No fungi identified.  PET/CT (2021): No lytic bone lesion Multiple new symmetric hypermetabolic slightly enlarged mediastinal and bilateral hilar lymph nodes, for example: -Para-aortic, 2.2 x 0.9 cm, SUV 11.4 107 -Subcarinal, 2.9 x 1.1 cm, SUV 16.3,  -Anterior upper mediastinum, 1.4 x 0.5 cm, SUV 15.0, SUV 98 -Right hilum, up to SUV 14.0 -Left hilum, up to SUV 13.9.  2024 Pet/CT 1. No osseous lesions suspicious for FDG avid malignancy.  2. 3.1 x 2.3 cm FDG avid exophytic mass at the distal pancreatic body/proximal tail, SUV max 3.3, unchanged in size since . A neoplastic lesion cannot be excluded. Further evaluation with MRI is recommended.  3. Mild, nonspecific uptake in a normal-sized subcarinal node. Otherwise, since , there has been interval resolution of hypermetabolic mediastinal and bilateral hilar lymphadenopathy.  4. Left total hip arthroplasty with muscle swelling about the left hip and possible fluid density. There is associated increased, inflammatory uptake, possibly postsurgical in nature. Correlate clinically.  5. Mildly FDG avid presacral edema and lower rectal wall thickening, probably representing proctitis or hemorrhoids.  Correlate clinically  [de-identified] : She is seen today for follow up  s/p RVD (9/25/19 - 1/29/21) On Revlimid maintenance ( 2/5/21 - present) - off since Sept due to stem cell collection - resumed in Jan 2022 Lower dose Revlimid 10 mg daily x 21 days on 5/5/022 - 5/31/22 -  6/3/22, restarted August 2022.  Revlimid held since 12/2022 due to Covid and worsening anemia.   patient with right hip pain, seen by ortho and pain management. Given Flexeril for pain prn and wants to hold hop hip replacement after her birthday Reports of not eating so well but weight is holding. She had a cold a few days ago with temp 100.5 and clear productive cough. Fever resolved but cough lingering.  Constipation improved with increasing hydration and MiraLAX prn.   Weight - 151 -> 143 lbs -> 140 lbs -> 135 lbs  -139 lbs -> 140 lbs -> 138 lbs -> 132 lbs -> 141 lbs  -> 147 lbs -> 150 lbs -> 156 lbs -> -> 153--> 158--> 160 lbs -> 159 lbs -> 162 lbs  -> 152 lbs (Covid)  -> 161 -> hip surgery--> 145 lbs -> 151 lbs

## 2025-01-24 NOTE — BEGINNING OF VISIT
[0] : 2) Feeling down, depressed, or hopeless: Not at all (0) [PHQ-2 Negative] : PHQ-2 Negative [Pain Scale: ___] : On a scale of 1-10, today the patient's pain is a(n) [unfilled]. [Former] : Former [> 15 Years] : > 15 Years [Date Discussed (MM/DD/YY): ___] : Discussed: [unfilled] [Patient/Caregiver unclear of wishes] : Patient/Caregiver unclear of wishes [Abdominal Pain] : no abdominal pain [Vomiting] : no vomiting [Constipation] : constipation [Diarrhea Character] : Diarrhea: Grade 0

## 2025-01-24 NOTE — HISTORY OF PRESENT ILLNESS
[de-identified] : Ms Marshall is a 61 year old female recently seen as inpatient consultation for PE, here for follow up  SHe was admitted to [facility] (19-19) palpitations found to have saddle PE CT angiogram: Saddle embolus with extensive pulmonary emboli extending throughout the right and left pulmonary arterial vasculature  She was noted to also have hypertensive urgency with elevated BP of 214/135. On labs,  an elevated D-dimer to 9.44 was noted which was suspicious for pulmonary embolism. On CT angiography of the chest she was noted to have a saddle embolus with extensive pulmonary emboli extending throughout the right and left pulmonary arterial vasculature. Patient was evaluated by Cardiology and an echocardiogram was done. She was admitted to the ICU for close monitoring and management. She was started on anticoagulation treatment for the pulmonary embolism with Lovenox 80 mg. Once she clinically improved with resolution of the palpitations and shortness of breath, and she remained hemodynamically stable with good oxygen saturations on room air, she was switched from Lovenox to Eliquis 10mg orally twice daily for the treatment of the pulmonary embolism. For the hypertensive urgency with elevated BPs, Cardiology adjusted patient's BP medications - added Amlodipine 5mg daily and Carvedilol 12.5mg orally twice daily. Patient's home Losartan was held during her stay because of the acute kidney injury. Patient's BPs became more controlled and remained stable after the medication adjustments. Patient was kept on IV fluids for the acute kidney injury which resolved. Patient noted chronic history of double vision resulting from right eye droopiness, but reported that she had recently had full neurological and ophthalmological workup done including CT scans and MRIs, which were all negative. This was confirmed by her PMD as well. Patient mentioned that she was prescribed special glasses by Neuro-opthalmology to wear to correct the double vision. She was instructed to continue with the glasses and to follow up with the Neuro-opthalmologist as outpatient. Because she did not have any neurological deficits, it was not deemed necessary to do further workup while inpatient. Patient will need to follow up with her PMD to evaluate whether she will need further workup.  She remained clinically and hemodynamically stable, and was deemed ready for discharge on .   She was discharged on eliquis  SHe is retired but continues to live a healthy life- stays active with her household chores and does yoga Denies any long distance travel, immobilization prior to the event No recent surgeries or hospitalization  No personal or family ho VTEs or miscarriages  She is ex smoker - quit 28 years back. SMoked 1/2 ppd  x 15 years  Hgb 9.8 WBC 3.5  SIFx: One IgG Kappa Band and One Weak Free Kappa Light Chain  Identified  SPEP: M-spike 1: 1.3 g/dl; Mspike 2: unable to quantitate FLCR: 85.80  FActor V Leiden mutation, Prothrombin gene mutation, APS panel: negative Serum viscosity: 1.7  Free protein S Antigen: 44 AT3 A AT3 activity: 124  D Dimer: 9.44 -> 2.17  Bone marrow core biopsy, clot sections and aspirate smears.(19) Plasma cell neoplasm, IgG Kappa, 5-8% of the bone marrow elements, see comment. Adequately maturing myeloid elements; slight erythroid hyperplasia. No evidence of overt dysplasia. No evidence of amyloid deposition. Normal karyotype; no aberrations detected by FISH analysis.  Bone marrow- 10% plasma cells Negative FISH for myeloma  2019 PET/CT without any bone metastases  SHe was recently admitted to [facility] (20-20) for fever, generalized weakness, malaise, bodyaches and nausea. Nasal swab was negative for flu and RSV. Pancultures were negative. SHe was empirically treated with levaquin pending cultures and was subsequently was discharged home  Reports that she fell on her way to the office. No head injury. Scraped her left knee and fingers with minimal bruise, She was bleeding for left fingers s/p dressing  She reports 3 month ho intermittent tingling and numbness in fingers and toes. For the past 2 months she has been dropping things and also has wobbling gait. She somehow did not mention during previous clinic visits She reports being very active prior- was performing yoga regularly SHe now walks with a cane SHe was recently admitted to [facility] (20-20) for fever, generalized weakness, malaise, bodyaches and nausea. Nasal swab was negative for flu and RSV. Pancultures were negative. SHe was empirically treated with levaquin pending cultures and was subsequently was discharged home A week later she fell on her way to the office. No head injury. Scraped her left knee and fingers with minimal bruise, She was bleeding for left fingers s/p dressing  She reports 3 month ho intermittent tingling and numbness in fingers and toes -> Today she reports no tingling and numbness x 1 week  For the past 2 months she has been dropping things and also has wobbling gait- she still drop things but not as often. Gait is back to normal  No longer needs a cane   She saw Dr. Cyril Ricks at [facility] on 12/3/20, was given pamphlet to read and to follow up for stem cell transplant.  She is agreeable to have stem cell collection. May not opt for transplant upfront She will follow up with Dr Ricks  Bone marrow biopsy, bone marrow clot, and bone marrow aspirate: -Markedly hypocellular bone marrow with erythroid predominance and focal/minimal trilineage hematopoiesis with maturation. -Iron stores are present. MRD was not sent  21 [facility] flow cytometry 0.02% phenotypically abnormal cells detected.  Monoclonal plasma cell phenotype, kappa positive.   Bone marrow biopsy, clot and bone marrow aspirate -normocellular bone marrow (overall 30% cellularity) with erythroid - predominantl trilineage hematopoiesis and maturation, iron stores presents. 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            AFB stain:  No mycobacteria identified.            GMS stain:  No fungi identified. C.  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         AFB stain:  No mycobacteria identified.         GMS stain:  No fungi identified.  PET/CT (2021): No lytic bone lesion Multiple new symmetric hypermetabolic slightly enlarged mediastinal and bilateral hilar lymph nodes, for example: -Para-aortic, 2.2 x 0.9 cm, SUV 11.4 107 -Subcarinal, 2.9 x 1.1 cm, SUV 16.3,  -Anterior upper mediastinum, 1.4 x 0.5 cm, SUV 15.0, SUV 98 -Right hilum, up to SUV 14.0 -Left hilum, up to SUV 13.9.  2024 Pet/CT 1. No osseous lesions suspicious for FDG avid malignancy.  2. 3.1 x 2.3 cm FDG avid exophytic mass at the distal pancreatic body/proximal tail, SUV max 3.3, unchanged in size since . A neoplastic lesion cannot be excluded. Further evaluation with MRI is recommended.  3. Mild, nonspecific uptake in a normal-sized subcarinal node. Otherwise, since , there has been interval resolution of hypermetabolic mediastinal and bilateral hilar lymphadenopathy.  4. Left total hip arthroplasty with muscle swelling about the left hip and possible fluid density. There is associated increased, inflammatory uptake, possibly postsurgical in nature. Correlate clinically.  5. Mildly FDG avid presacral edema and lower rectal wall thickening, probably representing proctitis or hemorrhoids.  Correlate clinically  [de-identified] : She is seen today for follow up  s/p RVD (9/25/19 - 1/29/21) On Revlimid maintenance ( 2/5/21 - present) - off since Sept due to stem cell collection - resumed in Jan 2022 Lower dose Revlimid 10 mg daily x 21 days on 5/5/022 - 5/31/22 -  6/3/22, restarted August 2022.  Revlimid held since 12/2022 due to Covid and worsening anemia.   patient with right hip pain, seen by ortho and pain management. Given Flexeril for pain prn and wants to hold hop hip replacement after her birthday Reports of not eating so well but weight is holding. She had a cold a few days ago with temp 100.5 and clear productive cough. Fever resolved but cough lingering.  Constipation improved with increasing hydration and MiraLAX prn.   Weight - 151 -> 143 lbs -> 140 lbs -> 135 lbs  -139 lbs -> 140 lbs -> 138 lbs -> 132 lbs -> 141 lbs  -> 147 lbs -> 150 lbs -> 156 lbs -> -> 153--> 158--> 160 lbs -> 159 lbs -> 162 lbs  -> 152 lbs (Covid)  -> 161 -> hip surgery--> 145 lbs -> 151 lbs

## 2025-01-29 NOTE — PHYSICAL EXAM
[Normal Sclera/Conjunctiva] : normal sclera/conjunctiva [Normal Outer Ear/Nose] : the outer ears and nose were normal in appearance [No JVD] : no jugular venous distention [Normal] : normal rate, regular rhythm, normal S1 and S2 and no murmur heard [Soft] : abdomen soft [Non Tender] : non-tender [Grossly Normal Strength/Tone] : grossly normal strength/tone [Coordination Grossly Intact] : coordination grossly intact [No Focal Deficits] : no focal deficits [Normal Affect] : the affect was normal [Normal Insight/Judgement] : insight and judgment were intact

## 2025-01-29 NOTE — HISTORY OF PRESENT ILLNESS
[de-identified] : cbc cmp done last week with heme  (a1c 4/2024 4.8%) lipids not done  used to go [facility] 4-[facility] years ago for pcp   zolpidem - taking for 4 years  trouble falling asleep and staying asleep   1. Do you snore loud enough to be heard through closed doors?  no  2. Do you often feel tired, fatigued, or sleepy during the daytime? yes  3.  Has anyone observed you to stop breathing during sleep?   no  4.  Do you have or are you being treated for high blood pressure?   yes  5.  Is your BMI > 35 kg/m2? no  6.  Is your age > 50 years old? yes  7.  Is your neck circumference larger than Male - 17 inches (43 cm) or Female- 16 inches (41 cm)   8.  Are you male?  MIGUEL Intermediate Risk: Yes to 3-4 questions  HTN - always elevated at the doctor  Has BP cuff at home: yes BP: SBP range from 130-140  Took meds this AM: YES Overall compliance: GOOD Salt intake: Limited Denies HA, blurry vision, N/V Meds:  coreg 25 bid    pain Gabapentin 3 times daily Flexeril 10 mg as needed refer to Dr. 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

## 2025-01-29 NOTE — HEALTH RISK ASSESSMENT
[Fair] : ~his/her~ current health as fair  [Excellent] : ~his/her~  mood as  excellent [Yes] : Yes [Monthly or less (1 pt)] : Monthly or less (1 point) [1 or 2 (0 pts)] : 1 or 2 (0 points) [Never (0 pts)] : Never (0 points) [No] : In the past 12 months have you used drugs other than those required for medical reasons? No [Two or more falls in past year] : Patient reported two or more falls in the past year [Little interest or pleasure doing things] : 1) Little interest or pleasure doing things [Feeling down, depressed, or hopeless] : 2) Feeling down, depressed, or hopeless [0] : 2) Feeling down, depressed, or hopeless: Not at all (0) [PHQ-2 Negative - No further assessment needed] : PHQ-2 Negative - No further assessment needed [Former] : Former [> 15 Years] : > 15 Years [NO] : No [Patient reported colonoscopy was normal] : Patient reported colonoscopy was normal [None] : None [With Family] : lives with family [Retired] : retired [Single] : single [Feels Safe at Home] : Feels safe at home [Fully functional (bathing, dressing, toileting, transferring, walking, feeding)] : Fully functional (bathing, dressing, toileting, transferring, walking, feeding) [Fully functional (using the telephone, shopping, preparing meals, housekeeping, doing laundry, using] : Fully functional and needs no help or supervision to perform IADLs (using the telephone, shopping, preparing meals, housekeeping, doing laundry, using transportation, managing medications and managing finances) [Reports changes in vision] : Reports changes in vision [Smoke Detector] : smoke detector [Carbon Monoxide Detector] : carbon monoxide detector [Seat Belt] :  uses seat belt [Time Spent: ___ Minutes] : I spent [unfilled] minutes performing a depression screening for this patient. [Audit-CScore] : 1 [de-identified] : yoga and leg exercises, meditate  [de-identified] : vegetarian  [[id number]] : 0 [Change in mental status noted] : No change in mental status noted [Language] : denies difficulty with language [Behavior] : denies difficulty with behavior [Learning/Retaining New Information] : denies difficulty learning/retaining new information [Handling Complex Tasks] : denies difficulty handling complex tasks [Reasoning] : denies difficulty with reasoning [Spatial Ability and Orientation] : denies difficulty with spatial ability and orientation [Sexually Active] : not sexually active [Reports changes in hearing] : Reports no changes in hearing [Reports changes in dental health] : Reports no changes in dental health [Sunscreen] : does not use sunscreen [ColonoscopyDate] : 01/19  [de-identified] : blurry vision

## 2025-04-03 NOTE — PHYSICAL EXAM
[Normal] : no acute distress, well nourished, well developed and well-appearing [Normal Sclera/Conjunctiva] : normal sclera/conjunctiva [Normal Outer Ear/Nose] : the outer ears and nose were normal in appearance [No JVD] : no jugular venous distention [No Respiratory Distress] : no respiratory distress  [Grossly Normal Strength/Tone] : grossly normal strength/tone [Coordination Grossly Intact] : coordination grossly intact [No Focal Deficits] : no focal deficits [Normal Affect] : the affect was normal [Normal Insight/Judgement] : insight and judgment were intact

## 2025-04-03 NOTE — HISTORY OF PRESENT ILLNESS
[FreeTextEntry1] : BP f/u  [de-identified] : HTN Has BP cuff at home: yes  BP: SBP range from 160-190 Took meds this AM: YES Overall compliance: GOOD Salt intake: Limited Denies HA, blurry vision, N/V Meds:  Carvedilol 25 mg twice daily

## 2025-04-18 NOTE — ASSESSMENT
[Reviewed updated] : Reviewed updated [Designated Health Care Proxy] : Designated Health Care Proxy [Name: ___] : Name: [unfilled] [Relationship: ___] : Relationship: [unfilled] [Full Code] : full code [FreeTextEntry1] : # Multiple myeloma One IgG Kappa Band and One Weak Free Kappa Light Chain FLCR 125, renal failure -> Based on IMWG definition- this is multiple myeloma Bone marrow shows 10% clonal plasma cells 9/26/2019 PET/CT without any bone metastases VD (9/25/19-1/21); Revlimid 25 mg 2 weeks on 1 week off since 10/21/2019. No aspirin as she is already on eliquis Acyclovir BID She has been on RVD ~16 months Bone marrow (1/21)- No evidence of myeloma. MRD was not sent Was in Complete remission 7/7/20-4/2021 Worsening Leukopenia and thrombocytopenia but no monoclonal gammopathy seen 9/22/21 bone marrow - 0.02% phenotypically abnormal cells detected. Stem cell collection with Dr. Ricks on 10/10-10/30/21 - Advise dot have stem cell transplant with she has postponed. Bone marrow (3/2023) shows no evidence of MM 7/29/2024 Pet/CT 1. No osseous lesions suspicious for FDG avid malignancy.  2. 3.1 x 2.3 cm FDG avid exophytic mass at the distal pancreatic body/proximal tail, SUV max 3.3, unchanged in size since 2021. A neoplastic lesion cannot be excluded. Further evaluation with MRI is recommended.  3. Mild, nonspecific uptake in a normal-sized subcarinal node. Otherwise, since 2021, there has been interval resolution of hypermetabolic mediastinal and bilateral hilar lymphadenopathy.  4. Left total hip arthroplasty with muscle swelling about the left hip and possible fluid density. There is associated increased, inflammatory uptake, possibly postsurgical in nature. Correlate clinically.  5. Mildly FDG avid presacral edema and lower rectal wall thickening, probably representing proctitis or hemorrhoids.  Correlate clinically  patient is here for follow up  Paraprotein remains stable and clinically without any bone pain or any associated symptoms. Explained abd discomfort and gas 2/2 to diet. To try to keep her BMs regular, increase hydration, and f/u with GI if not improved.  Advised to have repeat Pet/CT scan and will f/u with labs. Will repeat bone marrow prn pet/CT and labs result.  -Labs are drawn in the office, reviewed, analyzed, and discussed BUN/CR - 44/2.0 GFR - 29 - it can be 2/2 to underlying MM (although her Hgb is normal) or dehydration. Advised to push minimum 64 ounces of water per day and repeat labs next week.  continue to monitor for now.   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 Smith  Follow-up in 2 months CBC, CMP, MM, ferritin, iron panel, b12/folate  [AdvancecareDate] : 10/25/24

## 2025-04-18 NOTE — PHYSICAL EXAM
[Ambulatory and capable of all self care but unable to carry out any work activities] : Status 2- Ambulatory and capable of all self care but unable to carry out any work activities. Up and about more than 50% of waking hours [Normal] : affect appropriate [de-identified] : erin with cane

## 2025-04-18 NOTE — CONSULT LETTER
[Dear  ___] : Dear  [unfilled], [Courtesy Letter:] : I had the pleasure of seeing your patient, [unfilled], in my office today. [Please see my note below.] : Please see my note below. [Sincerely,] : Sincerely, [FreeTextEntry3] : Ishan Smith MD, MPH\par  Attending Physician\par  Hematology Oncology\par  [facility] [place]\par  [facility]\par

## 2025-04-18 NOTE — PHYSICAL EXAM
[Ambulatory and capable of all self care but unable to carry out any work activities] : Status 2- Ambulatory and capable of all self care but unable to carry out any work activities. Up and about more than 50% of waking hours [Normal] : affect appropriate [de-identified] : erin with cane

## 2025-04-18 NOTE — HISTORY OF PRESENT ILLNESS
[de-identified] : Ms Marshall is a 61 year old female recently seen as inpatient consultation for PE, here for follow up  SHe was admitted to [facility] (19-19) palpitations found to have saddle PE CT angiogram: Saddle embolus with extensive pulmonary emboli extending throughout the right and left pulmonary arterial vasculature  She was noted to also have hypertensive urgency with elevated BP of 214/135. On labs,  an elevated D-dimer to 9.44 was noted which was suspicious for pulmonary embolism. On CT angiography of the chest she was noted to have a saddle embolus with extensive pulmonary emboli extending throughout the right and left pulmonary arterial vasculature. Patient was evaluated by Cardiology and an echocardiogram was done. She was admitted to the ICU for close monitoring and management. She was started on anticoagulation treatment for the pulmonary embolism with Lovenox 80 mg. Once she clinically improved with resolution of the palpitations and shortness of breath, and she remained hemodynamically stable with good oxygen saturations on room air, she was switched from Lovenox to Eliquis 10mg orally twice daily for the treatment of the pulmonary embolism. For the hypertensive urgency with elevated BPs, Cardiology adjusted patient's BP medications - added Amlodipine 5mg daily and Carvedilol 12.5mg orally twice daily. Patient's home Losartan was held during her stay because of the acute kidney injury. Patient's BPs became more controlled and remained stable after the medication adjustments. Patient was kept on IV fluids for the acute kidney injury which resolved. Patient noted chronic history of double vision resulting from right eye droopiness, but reported that she had recently had full neurological and ophthalmological workup done including CT scans and MRIs, which were all negative. This was confirmed by her PMD as well. Patient mentioned that she was prescribed special glasses by Neuro-opthalmology to wear to correct the double vision. She was instructed to continue with the glasses and to follow up with the Neuro-opthalmologist as outpatient. Because she did not have any neurological deficits, it was not deemed necessary to do further workup while inpatient. Patient will need to follow up with her PMD to evaluate whether she will need further workup.  She remained clinically and hemodynamically stable, and was deemed ready for discharge on .   She was discharged on eliquis  SHe is retired but continues to live a healthy life- stays active with her household chores and does yoga Denies any long distance travel, immobilization prior to the event No recent surgeries or hospitalization  No personal or family ho VTEs or miscarriages  She is ex smoker - quit 28 years back. SMoked 1/2 ppd  x 15 years  Hgb 9.8 WBC 3.5  SIFx: One IgG Kappa Band and One Weak Free Kappa Light Chain  Identified  SPEP: M-spike 1: 1.3 g/dl; Mspike 2: unable to quantitate FLCR: 85.80  FActor V Leiden mutation, Prothrombin gene mutation, APS panel: negative Serum viscosity: 1.7  Free protein S Antigen: 44 AT3 A AT3 activity: 124  D Dimer: 9.44 -> 2.17  Bone marrow core biopsy, clot sections and aspirate smears.(19) Plasma cell neoplasm, IgG Kappa, 5-8% of the bone marrow elements, see comment. Adequately maturing myeloid elements; slight erythroid hyperplasia. No evidence of overt dysplasia. No evidence of amyloid deposition. Normal karyotype; no aberrations detected by FISH analysis.  Bone marrow- 10% plasma cells Negative FISH for myeloma  2019 PET/CT without any bone metastases  SHe was recently admitted to [facility] (20-20) for fever, generalized weakness, malaise, bodyaches and nausea. Nasal swab was negative for flu and RSV. Pancultures were negative. SHe was empirically treated with levaquin pending cultures and was subsequently was discharged home  Reports that she fell on her way to the office. No head injury. Scraped her left knee and fingers with minimal bruise, She was bleeding for left fingers s/p dressing  She reports 3 month ho intermittent tingling and numbness in fingers and toes. For the past 2 months she has been dropping things and also has wobbling gait. She somehow did not mention during previous clinic visits She reports being very active prior- was performing yoga regularly SHe now walks with a cane SHe was recently admitted to [facility] (20-20) for fever, generalized weakness, malaise, bodyaches and nausea. Nasal swab was negative for flu and RSV. Pancultures were negative. SHe was empirically treated with levaquin pending cultures and was subsequently was discharged home A week later she fell on her way to the office. No head injury. Scraped her left knee and fingers with minimal bruise, She was bleeding for left fingers s/p dressing  She reports 3 month ho intermittent tingling and numbness in fingers and toes -> Today she reports no tingling and numbness x 1 week  For the past 2 months she has been dropping things and also has wobbling gait- she still drop things but not as often. Gait is back to normal  No longer needs a cane   She saw Dr. Cyril Ricks at [facility] on 12/3/20, was given pamphlet to read and to follow up for stem cell transplant.  She is agreeable to have stem cell collection. May not opt for transplant upfront She will follow up with Dr Ricks  Bone marrow biopsy, bone marrow clot, and bone marrow aspirate: -Markedly hypocellular bone marrow with erythroid predominance and focal/minimal trilineage hematopoiesis with maturation. -Iron stores are present. MRD was not sent  21 [facility] flow cytometry 0.02% phenotypically abnormal cells detected.  Monoclonal plasma cell phenotype, kappa positive.   Bone marrow biopsy, clot and bone marrow aspirate -normocellular bone marrow (overall 30% cellularity) with erythroid - predominantl trilineage hematopoiesis and maturation, iron stores presents. 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            AFB stain:  No mycobacteria identified.            GMS stain:  No fungi identified. C.  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         AFB stain:  No mycobacteria identified.         GMS stain:  No fungi identified.  PET/CT (2021): No lytic bone lesion Multiple new symmetric hypermetabolic slightly enlarged mediastinal and bilateral hilar lymph nodes, for example: -Para-aortic, 2.2 x 0.9 cm, SUV 11.4 107 -Subcarinal, 2.9 x 1.1 cm, SUV 16.3,  -Anterior upper mediastinum, 1.4 x 0.5 cm, SUV 15.0, SUV 98 -Right hilum, up to SUV 14.0 -Left hilum, up to SUV 13.9.  2024 Pet/CT 1. No osseous lesions suspicious for FDG avid malignancy.  2. 3.1 x 2.3 cm FDG avid exophytic mass at the distal pancreatic body/proximal tail, SUV max 3.3, unchanged in size since . A neoplastic lesion cannot be excluded. Further evaluation with MRI is recommended.  3. Mild, nonspecific uptake in a normal-sized subcarinal node. Otherwise, since , there has been interval resolution of hypermetabolic mediastinal and bilateral hilar lymphadenopathy.  4. Left total hip arthroplasty with muscle swelling about the left hip and possible fluid density. There is associated increased, inflammatory uptake, possibly postsurgical in nature. Correlate clinically.  5. Mildly FDG avid presacral edema and lower rectal wall thickening, probably representing proctitis or hemorrhoids.  Correlate clinically  [de-identified] : She is seen today for follow up  s/p RVD (9/25/19 - 1/29/21) On Revlimid maintenance ( 2/5/21 - present) - off since Sept due to stem cell collection - resumed in Jan 2022 Lower dose Revlimid 10 mg daily x 21 days on 5/5/022 - 5/31/22 -  6/3/22, restarted August 2022.  Revlimid held since 12/2022 due to Covid and worsening anemia.   She has been having abd gas pain and discomfort, especially after she eats. Currently asymptomatic.  She does not have great appetite but pushing for oral intake and slowly gaining back the weight.   Weight - 151 -> 143 lbs -> 140 lbs -> 135 lbs  -139 lbs -> 140 lbs -> 138 lbs -> 132 lbs -> 141 lbs  -> 147 lbs -> 150 lbs -> 156 lbs -> -> 153--> 158--> 160 lbs -> 159 lbs -> 162 lbs  -> 152 lbs (Covid)  -> 161 -> hip surgery--> 145 lbs -> 151 lbs   --> 144--> 142 --> 141 lbs

## 2025-04-18 NOTE — HISTORY OF PRESENT ILLNESS
[de-identified] : Ms Marshall is a 61 year old female recently seen as inpatient consultation for PE, here for follow up  SHe was admitted to [facility] (19-19) palpitations found to have saddle PE CT angiogram: Saddle embolus with extensive pulmonary emboli extending throughout the right and left pulmonary arterial vasculature  She was noted to also have hypertensive urgency with elevated BP of 214/135. On labs,  an elevated D-dimer to 9.44 was noted which was suspicious for pulmonary embolism. On CT angiography of the chest she was noted to have a saddle embolus with extensive pulmonary emboli extending throughout the right and left pulmonary arterial vasculature. Patient was evaluated by Cardiology and an echocardiogram was done. She was admitted to the ICU for close monitoring and management. She was started on anticoagulation treatment for the pulmonary embolism with Lovenox 80 mg. Once she clinically improved with resolution of the palpitations and shortness of breath, and she remained hemodynamically stable with good oxygen saturations on room air, she was switched from Lovenox to Eliquis 10mg orally twice daily for the treatment of the pulmonary embolism. For the hypertensive urgency with elevated BPs, Cardiology adjusted patient's BP medications - added Amlodipine 5mg daily and Carvedilol 12.5mg orally twice daily. Patient's home Losartan was held during her stay because of the acute kidney injury. Patient's BPs became more controlled and remained stable after the medication adjustments. Patient was kept on IV fluids for the acute kidney injury which resolved. Patient noted chronic history of double vision resulting from right eye droopiness, but reported that she had recently had full neurological and ophthalmological workup done including CT scans and MRIs, which were all negative. This was confirmed by her PMD as well. Patient mentioned that she was prescribed special glasses by Neuro-opthalmology to wear to correct the double vision. She was instructed to continue with the glasses and to follow up with the Neuro-opthalmologist as outpatient. Because she did not have any neurological deficits, it was not deemed necessary to do further workup while inpatient. Patient will need to follow up with her PMD to evaluate whether she will need further workup.  She remained clinically and hemodynamically stable, and was deemed ready for discharge on .   She was discharged on eliquis  SHe is retired but continues to live a healthy life- stays active with her household chores and does yoga Denies any long distance travel, immobilization prior to the event No recent surgeries or hospitalization  No personal or family ho VTEs or miscarriages  She is ex smoker - quit 28 years back. SMoked 1/2 ppd  x 15 years  Hgb 9.8 WBC 3.5  SIFx: One IgG Kappa Band and One Weak Free Kappa Light Chain  Identified  SPEP: M-spike 1: 1.3 g/dl; Mspike 2: unable to quantitate FLCR: 85.80  FActor V Leiden mutation, Prothrombin gene mutation, APS panel: negative Serum viscosity: 1.7  Free protein S Antigen: 44 AT3 A AT3 activity: 124  D Dimer: 9.44 -> 2.17  Bone marrow core biopsy, clot sections and aspirate smears.(19) Plasma cell neoplasm, IgG Kappa, 5-8% of the bone marrow elements, see comment. Adequately maturing myeloid elements; slight erythroid hyperplasia. No evidence of overt dysplasia. No evidence of amyloid deposition. Normal karyotype; no aberrations detected by FISH analysis.  Bone marrow- 10% plasma cells Negative FISH for myeloma  2019 PET/CT without any bone metastases  SHe was recently admitted to [facility] (20-20) for fever, generalized weakness, malaise, bodyaches and nausea. Nasal swab was negative for flu and RSV. Pancultures were negative. SHe was empirically treated with levaquin pending cultures and was subsequently was discharged home  Reports that she fell on her way to the office. No head injury. Scraped her left knee and fingers with minimal bruise, She was bleeding for left fingers s/p dressing  She reports 3 month ho intermittent tingling and numbness in fingers and toes. For the past 2 months she has been dropping things and also has wobbling gait. She somehow did not mention during previous clinic visits She reports being very active prior- was performing yoga regularly SHe now walks with a cane SHe was recently admitted to [facility] (20-20) for fever, generalized weakness, malaise, bodyaches and nausea. Nasal swab was negative for flu and RSV. Pancultures were negative. SHe was empirically treated with levaquin pending cultures and was subsequently was discharged home A week later she fell on her way to the office. No head injury. Scraped her left knee and fingers with minimal bruise, She was bleeding for left fingers s/p dressing  She reports 3 month ho intermittent tingling and numbness in fingers and toes -> Today she reports no tingling and numbness x 1 week  For the past 2 months she has been dropping things and also has wobbling gait- she still drop things but not as often. Gait is back to normal  No longer needs a cane   She saw Dr. Cyril Ricks at [facility] on 12/3/20, was given pamphlet to read and to follow up for stem cell transplant.  She is agreeable to have stem cell collection. May not opt for transplant upfront She will follow up with Dr Ricks  Bone marrow biopsy, bone marrow clot, and bone marrow aspirate: -Markedly hypocellular bone marrow with erythroid predominance and focal/minimal trilineage hematopoiesis with maturation. -Iron stores are present. MRD was not sent  21 [facility] flow cytometry 0.02% phenotypically abnormal cells detected.  Monoclonal plasma cell phenotype, kappa positive.   Bone marrow biopsy, clot and bone marrow aspirate -normocellular bone marrow (overall 30% cellularity) with erythroid - predominantl trilineage hematopoiesis and maturation, iron stores presents. 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            AFB stain:  No mycobacteria identified.            GMS stain:  No fungi identified. C.  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         AFB stain:  No mycobacteria identified.         GMS stain:  No fungi identified.  PET/CT (2021): No lytic bone lesion Multiple new symmetric hypermetabolic slightly enlarged mediastinal and bilateral hilar lymph nodes, for example: -Para-aortic, 2.2 x 0.9 cm, SUV 11.4 107 -Subcarinal, 2.9 x 1.1 cm, SUV 16.3,  -Anterior upper mediastinum, 1.4 x 0.5 cm, SUV 15.0, SUV 98 -Right hilum, up to SUV 14.0 -Left hilum, up to SUV 13.9.  2024 Pet/CT 1. No osseous lesions suspicious for FDG avid malignancy.  2. 3.1 x 2.3 cm FDG avid exophytic mass at the distal pancreatic body/proximal tail, SUV max 3.3, unchanged in size since . A neoplastic lesion cannot be excluded. Further evaluation with MRI is recommended.  3. Mild, nonspecific uptake in a normal-sized subcarinal node. Otherwise, since , there has been interval resolution of hypermetabolic mediastinal and bilateral hilar lymphadenopathy.  4. Left total hip arthroplasty with muscle swelling about the left hip and possible fluid density. There is associated increased, inflammatory uptake, possibly postsurgical in nature. Correlate clinically.  5. Mildly FDG avid presacral edema and lower rectal wall thickening, probably representing proctitis or hemorrhoids.  Correlate clinically  [de-identified] : She is seen today for follow up  s/p RVD (9/25/19 - 1/29/21) On Revlimid maintenance ( 2/5/21 - present) - off since Sept due to stem cell collection - resumed in Jan 2022 Lower dose Revlimid 10 mg daily x 21 days on 5/5/022 - 5/31/22 -  6/3/22, restarted August 2022.  Revlimid held since 12/2022 due to Covid and worsening anemia.   She has been having abd gas pain and discomfort, especially after she eats. Currently asymptomatic.  She does not have great appetite but pushing for oral intake and slowly gaining back the weight.   Weight - 151 -> 143 lbs -> 140 lbs -> 135 lbs  -139 lbs -> 140 lbs -> 138 lbs -> 132 lbs -> 141 lbs  -> 147 lbs -> 150 lbs -> 156 lbs -> -> 153--> 158--> 160 lbs -> 159 lbs -> 162 lbs  -> 152 lbs (Covid)  -> 161 -> hip surgery--> 145 lbs -> 151 lbs   --> 144--> 142 --> 141 lbs

## 2025-04-25 NOTE — HISTORY OF PRESENT ILLNESS
[FreeTextEntry1] : HTN f/u  [de-identified] : HTN Has BP cuff at home: yes  BP: SBP range from 120-130 Took meds this AM: YES Overall compliance: GOOD Salt intake: Limited Denies HA, blurry vision, N/V Meds:  Carvedilol 25 mg twice daily olmesartan-hctz 20-12.5 mg

## 2025-04-25 NOTE — HISTORY OF PRESENT ILLNESS
[FreeTextEntry1] : HTN f/u  [de-identified] : HTN Has BP cuff at home: yes  BP: SBP range from 120-130 Took meds this AM: YES Overall compliance: GOOD Salt intake: Limited Denies HA, blurry vision, N/V Meds:  Carvedilol 25 mg twice daily olmesartan-hctz 20-12.5 mg

## 2025-04-25 NOTE — PLAN
[FreeTextEntry1] : Follow-up in 3 months for Ambien discussion and labs for olmesartan hydrochlorothiazide (BMP)

## 2025-05-14 NOTE — DISCUSSION/SUMMARY
[de-identified] : This patient has failed non-operative treatments for right hip arthritis and is now indicated for a total hip replacement.  I had a long discussion with the patient regarding the plan, the expected outcome, the risks, benefits, and alternatives of surgery. The risks include, but are not limited to, infection (which may require future surgery and removal of implants) , bleeding (which may require a transfusion), damage to nerves, arteries, veins, tendons, muscles and other adjacent structures, leading to numbness, weakness, continued pain, need for surgical repair, or decreased function. Also discussed the possibilities of dislocation, leg-length discrepancy, intraoperative and post-operative fractures, implant loosening, heterotopic bone formation, and revision for variety of reasons. We also discussed blood clots and other medical complications. This was discussed at length and consent has been obtained

## 2025-05-14 NOTE — HISTORY OF PRESENT ILLNESS
[de-identified] : Patient comes in with more than 6 months of right hip pain atraumatic in onset.  Patient has tried greater than 6 months of nsaids, physical therapy and doctor directed exercises and injections.  Patient continues to have pain that is 8/10 in severity and interferes with activities of daily living such as putting on shoes and socks, walking two blocks, and getting up and down from a chair and toilet.  hip osteoarthritis outcome score is 8.2

## 2025-05-14 NOTE — PHYSICAL EXAM
[de-identified] : painful restricted rom right hip skin intact nvid antalgic gait [de-identified] : right hip xrays show sig avn of right femoral head

## 2025-06-06 NOTE — HISTORY OF PRESENT ILLNESS
[de-identified] : Ms Marshall is a 61 year old female recently seen as inpatient consultation for PE, here for follow up  SHe was admitted to [facility] (19-19) palpitations found to have saddle PE CT angiogram: Saddle embolus with extensive pulmonary emboli extending throughout the right and left pulmonary arterial vasculature  She was noted to also have hypertensive urgency with elevated BP of 214/135. On labs,  an elevated D-dimer to 9.44 was noted which was suspicious for pulmonary embolism. On CT angiography of the chest she was noted to have a saddle embolus with extensive pulmonary emboli extending throughout the right and left pulmonary arterial vasculature. Patient was evaluated by Cardiology and an echocardiogram was done. She was admitted to the ICU for close monitoring and management. She was started on anticoagulation treatment for the pulmonary embolism with Lovenox 80 mg. Once she clinically improved with resolution of the palpitations and shortness of breath, and she remained hemodynamically stable with good oxygen saturations on room air, she was switched from Lovenox to Eliquis 10mg orally twice daily for the treatment of the pulmonary embolism. For the hypertensive urgency with elevated BPs, Cardiology adjusted patient's BP medications - added Amlodipine 5mg daily and Carvedilol 12.5mg orally twice daily. Patient's home Losartan was held during her stay because of the acute kidney injury. Patient's BPs became more controlled and remained stable after the medication adjustments. Patient was kept on IV fluids for the acute kidney injury which resolved. Patient noted chronic history of double vision resulting from right eye droopiness, but reported that she had recently had full neurological and ophthalmological workup done including CT scans and MRIs, which were all negative. This was confirmed by her PMD as well. Patient mentioned that she was prescribed special glasses by Neuro-opthalmology to wear to correct the double vision. She was instructed to continue with the glasses and to follow up with the Neuro-opthalmologist as outpatient. Because she did not have any neurological deficits, it was not deemed necessary to do further workup while inpatient. Patient will need to follow up with her PMD to evaluate whether she will need further workup.  She remained clinically and hemodynamically stable, and was deemed ready for discharge on .   She was discharged on eliquis  SHe is retired but continues to live a healthy life- stays active with her household chores and does yoga Denies any long distance travel, immobilization prior to the event No recent surgeries or hospitalization  No personal or family ho VTEs or miscarriages  She is ex smoker - quit 28 years back. SMoked 1/2 ppd  x 15 years  Hgb 9.8 WBC 3.5  SIFx: One IgG Kappa Band and One Weak Free Kappa Light Chain  Identified  SPEP: M-spike 1: 1.3 g/dl; Mspike 2: unable to quantitate FLCR: 85.80  FActor V Leiden mutation, Prothrombin gene mutation, APS panel: negative Serum viscosity: 1.7  Free protein S Antigen: 44 AT3 A AT3 activity: 124  D Dimer: 9.44 -> 2.17  Bone marrow core biopsy, clot sections and aspirate smears.(19) Plasma cell neoplasm, IgG Kappa, 5-8% of the bone marrow elements, see comment. Adequately maturing myeloid elements; slight erythroid hyperplasia. No evidence of overt dysplasia. No evidence of amyloid deposition. Normal karyotype; no aberrations detected by FISH analysis.  Bone marrow- 10% plasma cells Negative FISH for myeloma  2019 PET/CT without any bone metastases  SHe was recently admitted to [facility] (20-20) for fever, generalized weakness, malaise, bodyaches and nausea. Nasal swab was negative for flu and RSV. Pancultures were negative. SHe was empirically treated with levaquin pending cultures and was subsequently was discharged home  Reports that she fell on her way to the office. No head injury. Scraped her left knee and fingers with minimal bruise, She was bleeding for left fingers s/p dressing  She reports 3 month ho intermittent tingling and numbness in fingers and toes. For the past 2 months she has been dropping things and also has wobbling gait. She somehow did not mention during previous clinic visits She reports being very active prior- was performing yoga regularly SHe now walks with a cane SHe was recently admitted to [facility] (20-20) for fever, generalized weakness, malaise, bodyaches and nausea. Nasal swab was negative for flu and RSV. Pancultures were negative. SHe was empirically treated with levaquin pending cultures and was subsequently was discharged home A week later she fell on her way to the office. No head injury. Scraped her left knee and fingers with minimal bruise, She was bleeding for left fingers s/p dressing  She reports 3 month ho intermittent tingling and numbness in fingers and toes -> Today she reports no tingling and numbness x 1 week  For the past 2 months she has been dropping things and also has wobbling gait- she still drop things but not as often. Gait is back to normal  No longer needs a cane   She saw Dr. Cyril Ricks at [facility] on 12/3/20, was given pamphlet to read and to follow up for stem cell transplant.  She is agreeable to have stem cell collection. May not opt for transplant upfront She will follow up with Dr Ricks  Bone marrow biopsy, bone marrow clot, and bone marrow aspirate: -Markedly hypocellular bone marrow with erythroid predominance and focal/minimal trilineage hematopoiesis with maturation. -Iron stores are present. MRD was not sent  21 [facility] flow cytometry 0.02% phenotypically abnormal cells detected.  Monoclonal plasma cell phenotype, kappa positive.   Bone marrow biopsy, clot and bone marrow aspirate -normocellular bone marrow (overall 30% cellularity) with erythroid - predominantl trilineage hematopoiesis and maturation, iron stores presents. 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            AFB stain:  No mycobacteria identified.            GMS stain:  No fungi identified. C.  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         AFB stain:  No mycobacteria identified.         GMS stain:  No fungi identified.  PET/CT (2021): No lytic bone lesion Multiple new symmetric hypermetabolic slightly enlarged mediastinal and bilateral hilar lymph nodes, for example: -Para-aortic, 2.2 x 0.9 cm, SUV 11.4 107 -Subcarinal, 2.9 x 1.1 cm, SUV 16.3,  -Anterior upper mediastinum, 1.4 x 0.5 cm, SUV 15.0, SUV 98 -Right hilum, up to SUV 14.0 -Left hilum, up to SUV 13.9.  2024 Pet/CT 1. No osseous lesions suspicious for FDG avid malignancy.  2. 3.1 x 2.3 cm FDG avid exophytic mass at the distal pancreatic body/proximal tail, SUV max 3.3, unchanged in size since . A neoplastic lesion cannot be excluded. Further evaluation with MRI is recommended.  3. Mild, nonspecific uptake in a normal-sized subcarinal node. Otherwise, since , there has been interval resolution of hypermetabolic mediastinal and bilateral hilar lymphadenopathy.  4. Left total hip arthroplasty with muscle swelling about the left hip and possible fluid density. There is associated increased, inflammatory uptake, possibly postsurgical in nature. Correlate clinically.  5. Mildly FDG avid presacral edema and lower rectal wall thickening, probably representing proctitis or hemorrhoids.  Correlate clinically  [de-identified] : She is seen today for follow up  s/p RVD (9/25/19 - 1/29/21) On Revlimid maintenance ( 2/5/21 - present) - off since Sept due to stem cell collection - resumed in Jan 2022 Lower dose Revlimid 10 mg daily x 21 days on 5/5/022 - 5/31/22 -  6/3/22, restarted August 2022.  Revlimid held since 12/2022 due to Covid and worsening anemia.   She has been having abd gas, especially after she eats. States does not have pain/discomfort.  She does not have great appetite but pushing for oral intake and slowly gaining back the weight. Drinks ensure every morning. States get dizzy at times from when starting new BP medication (does not remember name)  PET/CT (4/2025): No significant abnormalities  Weight - 151 -> 143 lbs -> 140 lbs -> 135 lbs  -139 lbs -> 140 lbs -> 138 lbs -> 132 lbs -> 141 lbs  -> 147 lbs -> 150 lbs -> 156 lbs -> -> 153--> 158--> 160 lbs -> 159 lbs -> 162 lbs  -> 152 lbs (Covid)  -> 161 -> hip surgery--> 145 lbs -> 151 lbs   --> 144--> 142 --> 141 lbs

## 2025-06-06 NOTE — HISTORY OF PRESENT ILLNESS
[de-identified] : Ms Marshall is a 61 year old female recently seen as inpatient consultation for PE, here for follow up  SHe was admitted to [facility] (19-19) palpitations found to have saddle PE CT angiogram: Saddle embolus with extensive pulmonary emboli extending throughout the right and left pulmonary arterial vasculature  She was noted to also have hypertensive urgency with elevated BP of 214/135. On labs,  an elevated D-dimer to 9.44 was noted which was suspicious for pulmonary embolism. On CT angiography of the chest she was noted to have a saddle embolus with extensive pulmonary emboli extending throughout the right and left pulmonary arterial vasculature. Patient was evaluated by Cardiology and an echocardiogram was done. She was admitted to the ICU for close monitoring and management. She was started on anticoagulation treatment for the pulmonary embolism with Lovenox 80 mg. Once she clinically improved with resolution of the palpitations and shortness of breath, and she remained hemodynamically stable with good oxygen saturations on room air, she was switched from Lovenox to Eliquis 10mg orally twice daily for the treatment of the pulmonary embolism. For the hypertensive urgency with elevated BPs, Cardiology adjusted patient's BP medications - added Amlodipine 5mg daily and Carvedilol 12.5mg orally twice daily. Patient's home Losartan was held during her stay because of the acute kidney injury. Patient's BPs became more controlled and remained stable after the medication adjustments. Patient was kept on IV fluids for the acute kidney injury which resolved. Patient noted chronic history of double vision resulting from right eye droopiness, but reported that she had recently had full neurological and ophthalmological workup done including CT scans and MRIs, which were all negative. This was confirmed by her PMD as well. Patient mentioned that she was prescribed special glasses by Neuro-opthalmology to wear to correct the double vision. She was instructed to continue with the glasses and to follow up with the Neuro-opthalmologist as outpatient. Because she did not have any neurological deficits, it was not deemed necessary to do further workup while inpatient. Patient will need to follow up with her PMD to evaluate whether she will need further workup.  She remained clinically and hemodynamically stable, and was deemed ready for discharge on .   She was discharged on eliquis  SHe is retired but continues to live a healthy life- stays active with her household chores and does yoga Denies any long distance travel, immobilization prior to the event No recent surgeries or hospitalization  No personal or family ho VTEs or miscarriages  She is ex smoker - quit 28 years back. SMoked 1/2 ppd  x 15 years  Hgb 9.8 WBC 3.5  SIFx: One IgG Kappa Band and One Weak Free Kappa Light Chain  Identified  SPEP: M-spike 1: 1.3 g/dl; Mspike 2: unable to quantitate FLCR: 85.80  FActor V Leiden mutation, Prothrombin gene mutation, APS panel: negative Serum viscosity: 1.7  Free protein S Antigen: 44 AT3 A AT3 activity: 124  D Dimer: 9.44 -> 2.17  Bone marrow core biopsy, clot sections and aspirate smears.(19) Plasma cell neoplasm, IgG Kappa, 5-8% of the bone marrow elements, see comment. Adequately maturing myeloid elements; slight erythroid hyperplasia. No evidence of overt dysplasia. No evidence of amyloid deposition. Normal karyotype; no aberrations detected by FISH analysis.  Bone marrow- 10% plasma cells Negative FISH for myeloma  2019 PET/CT without any bone metastases  SHe was recently admitted to [facility] (20-20) for fever, generalized weakness, malaise, bodyaches and nausea. Nasal swab was negative for flu and RSV. Pancultures were negative. SHe was empirically treated with levaquin pending cultures and was subsequently was discharged home  Reports that she fell on her way to the office. No head injury. Scraped her left knee and fingers with minimal bruise, She was bleeding for left fingers s/p dressing  She reports 3 month ho intermittent tingling and numbness in fingers and toes. For the past 2 months she has been dropping things and also has wobbling gait. She somehow did not mention during previous clinic visits She reports being very active prior- was performing yoga regularly SHe now walks with a cane SHe was recently admitted to [facility] (20-20) for fever, generalized weakness, malaise, bodyaches and nausea. Nasal swab was negative for flu and RSV. Pancultures were negative. SHe was empirically treated with levaquin pending cultures and was subsequently was discharged home A week later she fell on her way to the office. No head injury. Scraped her left knee and fingers with minimal bruise, She was bleeding for left fingers s/p dressing  She reports 3 month ho intermittent tingling and numbness in fingers and toes -> Today she reports no tingling and numbness x 1 week  For the past 2 months she has been dropping things and also has wobbling gait- she still drop things but not as often. Gait is back to normal  No longer needs a cane   She saw Dr. Cyril Ricks at [facility] on 12/3/20, was given pamphlet to read and to follow up for stem cell transplant.  She is agreeable to have stem cell collection. May not opt for transplant upfront She will follow up with Dr Ricks  Bone marrow biopsy, bone marrow clot, and bone marrow aspirate: -Markedly hypocellular bone marrow with erythroid predominance and focal/minimal trilineage hematopoiesis with maturation. -Iron stores are present. MRD was not sent  21 [facility] flow cytometry 0.02% phenotypically abnormal cells detected.  Monoclonal plasma cell phenotype, kappa positive.   Bone marrow biopsy, clot and bone marrow aspirate -normocellular bone marrow (overall 30% cellularity) with erythroid - predominantl trilineage hematopoiesis and maturation, iron stores presents. 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            AFB stain:  No mycobacteria identified.            GMS stain:  No fungi identified. C.  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         AFB stain:  No mycobacteria identified.         GMS stain:  No fungi identified.  PET/CT (2021): No lytic bone lesion Multiple new symmetric hypermetabolic slightly enlarged mediastinal and bilateral hilar lymph nodes, for example: -Para-aortic, 2.2 x 0.9 cm, SUV 11.4 107 -Subcarinal, 2.9 x 1.1 cm, SUV 16.3,  -Anterior upper mediastinum, 1.4 x 0.5 cm, SUV 15.0, SUV 98 -Right hilum, up to SUV 14.0 -Left hilum, up to SUV 13.9.  2024 Pet/CT 1. No osseous lesions suspicious for FDG avid malignancy.  2. 3.1 x 2.3 cm FDG avid exophytic mass at the distal pancreatic body/proximal tail, SUV max 3.3, unchanged in size since . A neoplastic lesion cannot be excluded. Further evaluation with MRI is recommended.  3. Mild, nonspecific uptake in a normal-sized subcarinal node. Otherwise, since , there has been interval resolution of hypermetabolic mediastinal and bilateral hilar lymphadenopathy.  4. Left total hip arthroplasty with muscle swelling about the left hip and possible fluid density. There is associated increased, inflammatory uptake, possibly postsurgical in nature. Correlate clinically.  5. Mildly FDG avid presacral edema and lower rectal wall thickening, probably representing proctitis or hemorrhoids.  Correlate clinically  [de-identified] : She is seen today for follow up  s/p RVD (9/25/19 - 1/29/21) On Revlimid maintenance ( 2/5/21 - present) - off since Sept due to stem cell collection - resumed in Jan 2022 Lower dose Revlimid 10 mg daily x 21 days on 5/5/022 - 5/31/22 -  6/3/22, restarted August 2022.  Revlimid held since 12/2022 due to Covid and worsening anemia.   She has been having abd gas, especially after she eats. States does not have pain/discomfort.  She does not have great appetite but pushing for oral intake and slowly gaining back the weight. Drinks ensure every morning. States get dizzy at times from when starting new BP medication (does not remember name)  PET/CT (4/2025): No significant abnormalities  Weight - 151 -> 143 lbs -> 140 lbs -> 135 lbs  -139 lbs -> 140 lbs -> 138 lbs -> 132 lbs -> 141 lbs  -> 147 lbs -> 150 lbs -> 156 lbs -> -> 153--> 158--> 160 lbs -> 159 lbs -> 162 lbs  -> 152 lbs (Covid)  -> 161 -> hip surgery--> 145 lbs -> 151 lbs   --> 144--> 142 --> 141 lbs

## 2025-06-06 NOTE — CONSULT LETTER
[FreeTextEntry3] : Ishan Smith MD, MPH\par  Attending Physician\par  Hematology Oncology\par  [facility] [place]\par  [facility]\par

## 2025-06-06 NOTE — CONSULT LETTER
[FreeTextEntry3] : Ishan Smith MD, MPH\par  Attending Physician\par  Hematology Oncology\par  [facility] [place]\par  [facility]\par

## 2025-06-06 NOTE — ASSESSMENT
[Reviewed no changes] : Reviewed no changes [FreeTextEntry1] : # Multiple myeloma One IgG Kappa Band and One Weak Free Kappa Light Chain FLCR 125, renal failure -> Based on IMWG definition- this is multiple myeloma Bone marrow shows 10% clonal plasma cells 9/26/2019 PET/CT without any bone metastases VD (9/25/19-1/21); Revlimid 25 mg 2 weeks on 1 week off since 10/21/2019. No aspirin as she is already on eliquis Acyclovir BID She has been on RVD ~16 months Bone marrow (1/21)- No evidence of myeloma. MRD was not sent Was in Complete remission 7/7/20-4/2021 Worsening Leukopenia and thrombocytopenia but no monoclonal gammopathy seen 9/22/21 bone marrow - 0.02% phenotypically abnormal cells detected. Stem cell collection with Dr. Ricks on 10/10-10/30/21 - Advise dot have stem cell transplant with she has postponed. Bone marrow (3/2023) shows no evidence of MM 7/29/2024 Pet/CT 1. No osseous lesions suspicious for FDG avid malignancy.  2. 3.1 x 2.3 cm FDG avid exophytic mass at the distal pancreatic body/proximal tail, SUV max 3.3, unchanged in size since 2021. A neoplastic lesion cannot be excluded. Further evaluation with MRI is recommended.  3. Mild, nonspecific uptake in a normal-sized subcarinal node. Otherwise, since 2021, there has been interval resolution of hypermetabolic mediastinal and bilateral hilar lymphadenopathy.  4. Left total hip arthroplasty with muscle swelling about the left hip and possible fluid density. There is associated increased, inflammatory uptake, possibly postsurgical in nature. Correlate clinically.  5. Mildly FDG avid presacral edema and lower rectal wall thickening, probably representing proctitis or hemorrhoids.  Correlate clinically Patient is here for follow up  Paraprotein remains stable and clinically without any bone pain or any associated symptoms. Explained abd discomfort and gas 2/2 to diet. To try to keep her BMs regular, increase hydration, and f/u with GI if not improved.  PET/CT (4/2025): No significant abnormalities Labs ordered, drawn in the office, reviewed, analyzed and discussed continue to monitor for now.   Appetite loss Weight loss. 10 lbs over few months c/o bloating, flatulence. 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

## 2025-07-15 NOTE — PHYSICAL EXAM
[No Acute Distress] : no acute distress [Normal Sclera/Conjunctiva] : normal sclera/conjunctiva [Normal Outer Ear/Nose] : the outer ears and nose were normal in appearance [No JVD] : no jugular venous distention [No Respiratory Distress] : no respiratory distress  [Grossly Normal Strength/Tone] : grossly normal strength/tone [No Focal Deficits] : no focal deficits [Normal Affect] : the affect was normal [Normal Insight/Judgement] : insight and judgment were intact

## 2025-07-24 NOTE — HISTORY OF PRESENT ILLNESS
[FreeTextEntry1] : follow up  [de-identified] : Went to urgent care 6/20/2025 claimed to have a tick attached for 2 weeks with a rash on the left upper back was having malaise chills lethargy and headache and bodyaches.  No blood work done just gave doxycycline 100 mg twice daily for 14 days.  She comes in today and feels great.  No issues all symptoms have resolved.   Continues to cut back on the Ambien maybe uses half a pill twice a week  She is trying hard to wean herself completely off of it uses sleepy time tea and statin seems to work  canceled her hip replacement because wants to do workup from heme onc and gastroenterology first She is also having right knee pain where she had a right knee replacement in 2004 and wondering who she should see as the doctor who did the original surgery is no longer practicing

## 2025-07-24 NOTE — HISTORY OF PRESENT ILLNESS
[FreeTextEntry1] : follow up  [de-identified] : Went to urgent care 6/20/2025 claimed to have a tick attached for 2 weeks with a rash on the left upper back was having malaise chills lethargy and headache and bodyaches.  No blood work done just gave doxycycline 100 mg twice daily for 14 days.  She comes in today and feels great.  No issues all symptoms have resolved.   Continues to cut back on the Ambien maybe uses half a pill twice a week  She is trying hard to wean herself completely off of it uses sleepy time tea and statin seems to work  canceled her hip replacement because wants to do workup from heme onc and gastroenterology first She is also having right knee pain where she had a right knee replacement in 2004 and wondering who she should see as the doctor who did the original surgery is no longer practicing